# Patient Record
Sex: MALE | Race: WHITE | NOT HISPANIC OR LATINO | RURAL
[De-identification: names, ages, dates, MRNs, and addresses within clinical notes are randomized per-mention and may not be internally consistent; named-entity substitution may affect disease eponyms.]

---

## 2019-09-25 ENCOUNTER — HISTORICAL (OUTPATIENT)
Dept: ADMINISTRATIVE | Facility: HOSPITAL | Age: 79
End: 2019-09-25

## 2019-09-27 LAB
LAB AP CLINICAL INFORMATION: NORMAL
LAB AP COMMENTS: NORMAL
LAB AP DIAGNOSIS - HISTORICAL: NORMAL
LAB AP GROSS PATHOLOGY - HISTORICAL: NORMAL
LAB AP SPECIMEN SUBMITTED - HISTORICAL: NORMAL

## 2020-09-07 ENCOUNTER — HISTORICAL (OUTPATIENT)
Dept: ADMINISTRATIVE | Facility: HOSPITAL | Age: 80
End: 2020-09-07

## 2020-09-07 LAB — SARS-COV-2 RNA AMPLIFICATION, QUAL: NEGATIVE

## 2020-09-10 ENCOUNTER — HISTORICAL (OUTPATIENT)
Dept: ADMINISTRATIVE | Facility: HOSPITAL | Age: 80
End: 2020-09-10

## 2020-09-10 LAB
ABO: NORMAL
ACANTHOCYTES BLD QL SMEAR: ABNORMAL
ALBUMIN SERPL BCP-MCNC: 1.3 G/DL (ref 3.5–5)
ALBUMIN/GLOB SERPL: 0.8 {RATIO}
ALP SERPL-CCNC: 35 U/L (ref 45–115)
ALT SERPL W P-5'-P-CCNC: 10 U/L (ref 16–61)
ANION GAP SERPL CALCULATED.3IONS-SCNC: 12 MMOL/L
ANISOCYTOSIS BLD QL SMEAR: ABNORMAL
ANTIBODY SCREEN: NORMAL
APTT PPP: 38.5 SECONDS (ref 25.2–37.3)
AST SERPL W P-5'-P-CCNC: 11 U/L (ref 15–37)
BASOPHILS # BLD AUTO: 0.02 X10E3/UL (ref 0–0.2)
BASOPHILS NFR BLD AUTO: 0.3 % (ref 0–1)
BILIRUB SERPL-MCNC: 0.5 MG/DL (ref 0–1.2)
BUN SERPL-MCNC: 11 MG/DL (ref 7–18)
BUN/CREAT SERPL: 20
CALCIUM SERPL-MCNC: 4.7 MG/DL (ref 8.5–10.1)
CHLORIDE SERPL-SCNC: 121 MMOL/L (ref 98–107)
CK MB SERPL-MCNC: <1 NG/ML (ref 1–3.6)
CK SERPL-CCNC: 27 U/L (ref 39–308)
CO2 SERPL-SCNC: 15 MMOL/L (ref 21–32)
CREAT SERPL-MCNC: 0.55 MG/DL (ref 0.7–1.3)
CRENATED CELLS: ABNORMAL
D DIMER PPP FEU-MCNC: 5.31 UG/ML (ref 0–0.47)
EOSINOPHIL # BLD AUTO: 0.07 X10E3/UL (ref 0–0.5)
EOSINOPHIL NFR BLD AUTO: 1.1 % (ref 1–4)
EOSINOPHIL NFR BLD MANUAL: 4 % (ref 1–4)
ERYTHROCYTE [DISTWIDTH] IN BLOOD BY AUTOMATED COUNT: 16.1 % (ref 11.5–14.5)
FERRITIN SERPL-MCNC: 97 NG/ML (ref 26–388)
FOLATE SERPL-MCNC: 4.6 NG/ML (ref 3.1–17.5)
GLOBULIN SER-MCNC: 1.7 G/DL (ref 2–4)
GLUCOSE SERPL-MCNC: 80 MG/DL (ref 74–106)
HCT VFR BLD AUTO: 22.1 % (ref 40–54)
HCT VFR BLD AUTO: 38.6 % (ref 40–54)
HGB BLD-MCNC: 12.3 G/DL (ref 13.5–18)
HGB BLD-MCNC: 6.5 G/DL (ref 13.5–18)
HYPOCHROMIA BLD QL SMEAR: SLIGHT
IMM GRANULOCYTES # BLD AUTO: 0.08 X10E3/UL (ref 0–0.04)
IMM GRANULOCYTES NFR BLD: 1.3 % (ref 0–0.4)
IMM RETICS NFR: 17.7 % (ref 2.3–13.4)
INR BLD: 1.59 (ref 0–3.3)
IRON SATN MFR SERPL: 69 % (ref 14–50)
IRON SERPL-MCNC: 68 UG/DL (ref 65–175)
LACTATE SERPL-SCNC: 2.4 MMOL/L (ref 0.4–2)
LACTATE SERPL-SCNC: 3.1 MMOL/L (ref 0.4–2)
LYMPHOCYTES # BLD AUTO: 0.35 X10E3/UL (ref 1–4.8)
LYMPHOCYTES NFR BLD AUTO: 5.5 % (ref 27–41)
LYMPHOCYTES NFR BLD MANUAL: 7 % (ref 27–41)
MAGNESIUM SERPL-MCNC: 1 MG/DL (ref 1.7–2.3)
MCH RBC QN AUTO: 27.9 PG (ref 27–31)
MCHC RBC AUTO-ENTMCNC: 29.4 G/DL (ref 32–36)
MCHC RBC AUTO-ENTMCNC: 31.9 G/DL (ref 32–36)
MCV RBC AUTO: 94.8 FL (ref 80–96)
MONOCYTES # BLD AUTO: 0.12 X10E3/UL (ref 0–0.8)
MONOCYTES NFR BLD AUTO: 1.9 % (ref 2–6)
MONOCYTES NFR BLD MANUAL: 1 % (ref 2–6)
MPC BLD CALC-MCNC: 9.4 FL (ref 9.4–12.4)
MYOGLOBIN SERPL-MCNC: 65 NG/ML (ref 16–116)
NEUTROPHILS # BLD AUTO: 5.74 X10E3/UL (ref 1.8–7.7)
NEUTROPHILS NFR BLD AUTO: 89.9 % (ref 53–65)
NEUTS BAND NFR BLD MANUAL: 3 % (ref 1–5)
NEUTS SEG NFR BLD MANUAL: 85 % (ref 50–62)
NRBC # BLD AUTO: 0 X10E3/UL (ref 0–0)
NRBC, AUTO (.00): 0 /100 (ref 0–0)
OVALOCYTES BLD QL SMEAR: ABNORMAL
PLATELET # BLD AUTO: 163 X10E3/UL (ref 150–400)
PLATELET MORPHOLOGY: NORMAL
POTASSIUM SERPL-SCNC: 2.2 MMOL/L (ref 3.5–5.1)
PROT SERPL-MCNC: 3 G/DL (ref 6.4–8.2)
PROTHROMBIN TIME: 18.1 SECONDS (ref 11.7–14.7)
RBC # BLD AUTO: 2.33 X10E6/UL (ref 4.6–6.2)
RETICS # AUTO: 0.07 X10E6/UL (ref 0.02–0.11)
RETICS/RBC NFR AUTO: 1.8 % (ref 0.4–2.2)
RH TYPE: NORMAL
SODIUM SERPL-SCNC: 146 MMOL/L (ref 136–145)
TIBC SERPL-MCNC: 99 UG/DL (ref 250–450)
TROPONIN I SERPL-MCNC: 0.03 NG/ML (ref 0–0.06)
UNIT NUMBER: NORMAL
UNIT NUMBER: NORMAL
UNIT STATUS: NORMAL
UNIT STATUS: NORMAL
VIT B12 SERPL-MCNC: 165 PG/ML (ref 193–986)
WBC # BLD AUTO: 6.38 X10E3/UL (ref 4.5–11)

## 2020-09-11 ENCOUNTER — HISTORICAL (OUTPATIENT)
Dept: ADMINISTRATIVE | Facility: HOSPITAL | Age: 80
End: 2020-09-11

## 2020-09-11 LAB
ALBUMIN SERPL BCP-MCNC: 2.7 G/DL (ref 3.5–5)
ALBUMIN/GLOB SERPL: 0.8 {RATIO}
ALP SERPL-CCNC: 64 U/L (ref 45–115)
ALT SERPL W P-5'-P-CCNC: 19 U/L (ref 16–61)
ANION GAP SERPL CALCULATED.3IONS-SCNC: 13 MMOL/L
ANISOCYTOSIS BLD QL SMEAR: ABNORMAL
AST SERPL W P-5'-P-CCNC: 26 U/L (ref 15–37)
BASOPHILS # BLD AUTO: 0.05 X10E3/UL (ref 0–0.2)
BASOPHILS NFR BLD AUTO: 0.4 % (ref 0–1)
BILIRUB SERPL-MCNC: 0.9 MG/DL (ref 0–1.2)
BUN SERPL-MCNC: 26 MG/DL (ref 7–18)
BUN/CREAT SERPL: 20
CALCIUM SERPL-MCNC: 9.5 MG/DL (ref 8.5–10.1)
CHLORIDE SERPL-SCNC: 110 MMOL/L (ref 98–107)
CHOLEST SERPL-MCNC: 94 MG/DL
CHOLEST/HDLC SERPL: 2.4 {RATIO}
CO2 SERPL-SCNC: 25 MMOL/L (ref 21–32)
CREAT SERPL-MCNC: 1.3 MG/DL (ref 0.7–1.3)
CRENATED CELLS: ABNORMAL
CRP SERPL-MCNC: 8.8 UG/ML (ref 0–0.8)
EOSINOPHIL # BLD AUTO: 0.07 X10E3/UL (ref 0–0.5)
EOSINOPHIL NFR BLD AUTO: 0.6 % (ref 1–4)
ERYTHROCYTE [DISTWIDTH] IN BLOOD BY AUTOMATED COUNT: 15.7 % (ref 11.5–14.5)
ERYTHROCYTE [SEDIMENTATION RATE] IN BLOOD BY WESTERGREN METHOD: 17 MM/HR (ref 0–20)
GLOBULIN SER-MCNC: 3.3 G/DL (ref 2–4)
GLUCOSE SERPL-MCNC: 109 MG/DL (ref 74–106)
HCT VFR BLD AUTO: 38.2 % (ref 40–54)
HCT VFR BLD AUTO: 39 % (ref 40–54)
HDLC SERPL-MCNC: 39 MG/DL
HGB BLD-MCNC: 12 G/DL (ref 13.5–18)
HGB BLD-MCNC: 12.1 G/DL (ref 13.5–18)
IMM GRANULOCYTES # BLD AUTO: 0.09 X10E3/UL (ref 0–0.04)
IMM GRANULOCYTES NFR BLD: 0.7 % (ref 0–0.4)
LDLC SERPL CALC-MCNC: 30 MG/DL
LYMPHOCYTES # BLD AUTO: 0.5 X10E3/UL (ref 1–4.8)
LYMPHOCYTES NFR BLD AUTO: 4 % (ref 27–41)
LYMPHOCYTES NFR BLD MANUAL: 5 % (ref 27–41)
MAGNESIUM SERPL-MCNC: 2.1 MG/DL (ref 1.7–2.3)
MCH RBC QN AUTO: 27.7 PG (ref 27–31)
MCHC RBC AUTO-ENTMCNC: 31 G/DL (ref 32–36)
MCHC RBC AUTO-ENTMCNC: 31.4 G/DL (ref 32–36)
MCV RBC AUTO: 88.2 FL (ref 80–96)
MONOCYTES # BLD AUTO: 0.74 X10E3/UL (ref 0–0.8)
MONOCYTES NFR BLD AUTO: 5.9 % (ref 2–6)
MONOCYTES NFR BLD MANUAL: 6 % (ref 2–6)
MPC BLD CALC-MCNC: 9.9 FL (ref 9.4–12.4)
NEUTROPHILS # BLD AUTO: 11.14 X10E3/UL (ref 1.8–7.7)
NEUTROPHILS NFR BLD AUTO: 88.4 % (ref 53–65)
NEUTS BAND NFR BLD MANUAL: 4 % (ref 1–5)
NEUTS SEG NFR BLD MANUAL: 85 % (ref 50–62)
NRBC # BLD AUTO: 0 X10E3/UL (ref 0–0)
NRBC, AUTO (.00): 0 /100 (ref 0–0)
OCCULT BLOOD: POSITIVE
OVALOCYTES BLD QL SMEAR: ABNORMAL
PLATELET # BLD AUTO: 237 X10E3/UL (ref 150–400)
PLATELET MORPHOLOGY: ABNORMAL
POTASSIUM SERPL-SCNC: 5.2 MMOL/L (ref 3.5–5.1)
POTASSIUM SERPL-SCNC: 6 MMOL/L (ref 3.5–5.1)
PREALB SERPL NEPH-MCNC: 18 MG/DL (ref 20–40)
PROT SERPL-MCNC: 6 G/DL (ref 6.4–8.2)
RBC # BLD AUTO: 4.33 X10E6/UL (ref 4.6–6.2)
SODIUM SERPL-SCNC: 142 MMOL/L (ref 136–145)
T4 FREE SERPL-MCNC: 1.19 NG/DL (ref 0.76–1.46)
TRIGL SERPL-MCNC: 123 MG/DL
TSH SERPL DL<=0.005 MIU/L-ACNC: 1.96 UIU/ML (ref 0.36–3.74)
WBC # BLD AUTO: 12.59 X10E3/UL (ref 4.5–11)

## 2020-10-12 ENCOUNTER — HISTORICAL (OUTPATIENT)
Dept: ADMINISTRATIVE | Facility: HOSPITAL | Age: 80
End: 2020-10-12

## 2020-10-12 LAB — SARS-COV+SARS-COV-2 AG RESP QL IA.RAPID: NEGATIVE

## 2020-10-14 ENCOUNTER — HISTORICAL (OUTPATIENT)
Dept: ADMINISTRATIVE | Facility: HOSPITAL | Age: 80
End: 2020-10-14

## 2020-10-14 LAB — SARS-COV+SARS-COV-2 AG RESP QL IA.RAPID: NEGATIVE

## 2020-10-19 ENCOUNTER — HISTORICAL (OUTPATIENT)
Dept: ADMINISTRATIVE | Facility: HOSPITAL | Age: 80
End: 2020-10-19

## 2020-10-19 LAB — SARS-COV+SARS-COV-2 AG RESP QL IA.RAPID: NEGATIVE

## 2020-10-21 ENCOUNTER — HISTORICAL (OUTPATIENT)
Dept: ADMINISTRATIVE | Facility: HOSPITAL | Age: 80
End: 2020-10-21

## 2020-10-21 LAB — SARS-COV+SARS-COV-2 AG RESP QL IA.RAPID: NEGATIVE

## 2020-11-12 ENCOUNTER — HISTORICAL (OUTPATIENT)
Dept: ADMINISTRATIVE | Facility: HOSPITAL | Age: 80
End: 2020-11-12

## 2020-11-12 LAB
ALBUMIN SERPL BCP-MCNC: 2.8 G/DL (ref 3.5–5)
ALBUMIN/GLOB SERPL: 0.9 {RATIO}
ALP SERPL-CCNC: 77 U/L (ref 45–115)
ALT SERPL W P-5'-P-CCNC: 14 U/L (ref 16–61)
ANION GAP SERPL CALCULATED.3IONS-SCNC: 8 MMOL/L (ref 7–16)
AST SERPL W P-5'-P-CCNC: 12 U/L (ref 15–37)
BILIRUB SERPL-MCNC: 0.4 MG/DL (ref 0–1.2)
BUN SERPL-MCNC: 23 MG/DL (ref 7–18)
BUN/CREAT SERPL: 28
CALCIUM SERPL-MCNC: 8.2 MG/DL (ref 8.5–10.1)
CHLORIDE SERPL-SCNC: 107 MMOL/L (ref 98–107)
CO2 SERPL-SCNC: 33 MMOL/L (ref 21–32)
CREAT SERPL-MCNC: 0.82 MG/DL (ref 0.7–1.3)
GLOBULIN SER-MCNC: 3 G/DL (ref 2–4)
GLUCOSE SERPL-MCNC: 73 MG/DL (ref 74–106)
POTASSIUM SERPL-SCNC: 3.8 MMOL/L (ref 3.5–5.1)
PROT SERPL-MCNC: 5.8 G/DL (ref 6.4–8.2)
SODIUM SERPL-SCNC: 144 MMOL/L (ref 136–145)

## 2020-11-17 ENCOUNTER — HISTORICAL (OUTPATIENT)
Dept: ADMINISTRATIVE | Facility: HOSPITAL | Age: 80
End: 2020-11-17

## 2020-11-18 LAB — SARS-COV+SARS-COV-2 AG RESP QL IA.RAPID: NEGATIVE

## 2020-11-19 ENCOUNTER — HISTORICAL (OUTPATIENT)
Dept: ADMINISTRATIVE | Facility: HOSPITAL | Age: 80
End: 2020-11-19

## 2020-11-19 LAB — SARS-COV+SARS-COV-2 AG RESP QL IA.RAPID: NEGATIVE

## 2020-12-10 ENCOUNTER — HISTORICAL (OUTPATIENT)
Dept: ADMINISTRATIVE | Facility: HOSPITAL | Age: 80
End: 2020-12-10

## 2020-12-10 LAB — SARS-COV+SARS-COV-2 AG RESP QL IA.RAPID: NEGATIVE

## 2020-12-17 ENCOUNTER — HISTORICAL (OUTPATIENT)
Dept: ADMINISTRATIVE | Facility: HOSPITAL | Age: 80
End: 2020-12-17

## 2020-12-17 LAB — SARS-COV+SARS-COV-2 AG RESP QL IA.RAPID: NEGATIVE

## 2020-12-28 ENCOUNTER — HISTORICAL (OUTPATIENT)
Dept: ADMINISTRATIVE | Facility: HOSPITAL | Age: 80
End: 2020-12-28

## 2020-12-29 LAB — SARS-COV+SARS-COV-2 AG RESP QL IA.RAPID: NEGATIVE

## 2021-01-01 ENCOUNTER — HISTORICAL (OUTPATIENT)
Dept: ADMINISTRATIVE | Facility: HOSPITAL | Age: 81
End: 2021-01-01

## 2021-01-01 LAB — SARS-COV+SARS-COV-2 AG RESP QL IA.RAPID: NEGATIVE

## 2021-01-05 ENCOUNTER — HISTORICAL (OUTPATIENT)
Dept: ADMINISTRATIVE | Facility: HOSPITAL | Age: 81
End: 2021-01-05

## 2021-01-05 LAB — SARS-COV+SARS-COV-2 AG RESP QL IA.RAPID: NEGATIVE

## 2021-01-09 ENCOUNTER — HISTORICAL (OUTPATIENT)
Dept: ADMINISTRATIVE | Facility: HOSPITAL | Age: 81
End: 2021-01-09

## 2021-01-09 LAB — SARS-COV+SARS-COV-2 AG RESP QL IA.RAPID: NEGATIVE

## 2021-02-05 ENCOUNTER — HISTORICAL (OUTPATIENT)
Dept: ADMINISTRATIVE | Facility: HOSPITAL | Age: 81
End: 2021-02-05

## 2021-02-05 LAB — SARS-COV+SARS-COV-2 AG RESP QL IA.RAPID: NEGATIVE

## 2021-02-24 ENCOUNTER — HISTORICAL (OUTPATIENT)
Dept: ADMINISTRATIVE | Facility: HOSPITAL | Age: 81
End: 2021-02-24

## 2021-02-25 LAB — SARS-COV+SARS-COV-2 AG RESP QL IA.RAPID: NEGATIVE

## 2021-03-04 ENCOUNTER — HISTORICAL (OUTPATIENT)
Dept: ADMINISTRATIVE | Facility: HOSPITAL | Age: 81
End: 2021-03-04

## 2021-03-05 LAB — SARS-COV+SARS-COV-2 AG RESP QL IA.RAPID: NEGATIVE

## 2023-11-27 ENCOUNTER — INITIAL CONSULT (OUTPATIENT)
Dept: VASCULAR SURGERY | Facility: CLINIC | Age: 83
DRG: 256 | End: 2023-11-27
Payer: MEDICARE

## 2023-11-27 ENCOUNTER — HOSPITAL ENCOUNTER (INPATIENT)
Facility: HOSPITAL | Age: 83
LOS: 1 days | Discharge: NURSING, REHAB OR LONG-TERM CARE | DRG: 256 | End: 2023-11-28
Attending: EMERGENCY MEDICINE | Admitting: SURGERY
Payer: MEDICARE

## 2023-11-27 DIAGNOSIS — L02.612 ABSCESS, TOE, LEFT: ICD-10-CM

## 2023-11-27 DIAGNOSIS — I73.9 PVD (PERIPHERAL VASCULAR DISEASE): ICD-10-CM

## 2023-11-27 DIAGNOSIS — L03.90 CELLULITIS, UNSPECIFIED CELLULITIS SITE: Primary | ICD-10-CM

## 2023-11-27 DIAGNOSIS — L02.612 ABSCESS, TOE, LEFT: Primary | ICD-10-CM

## 2023-11-27 LAB — SARS-COV-2 RDRP RESP QL NAA+PROBE: NEGATIVE

## 2023-11-27 PROCEDURE — 99285 PR EMERGENCY DEPT VISIT,LEVEL V: ICD-10-PCS | Mod: ,,, | Performed by: EMERGENCY MEDICINE

## 2023-11-27 PROCEDURE — 99212 OFFICE O/P EST SF 10 MIN: CPT | Mod: PBBFAC | Performed by: NURSE PRACTITIONER

## 2023-11-27 PROCEDURE — 99205 OFFICE O/P NEW HI 60 MIN: CPT | Mod: S$PBB,,, | Performed by: NURSE PRACTITIONER

## 2023-11-27 PROCEDURE — 87635 SARS-COV-2 COVID-19 AMP PRB: CPT | Performed by: SURGERY

## 2023-11-27 PROCEDURE — 99285 EMERGENCY DEPT VISIT HI MDM: CPT | Mod: ,,, | Performed by: EMERGENCY MEDICINE

## 2023-11-27 PROCEDURE — 25000003 PHARM REV CODE 250: Performed by: SURGERY

## 2023-11-27 PROCEDURE — 11000001 HC ACUTE MED/SURG PRIVATE ROOM

## 2023-11-27 PROCEDURE — 99205 PR OFFICE/OUTPT VISIT, NEW, LEVL V, 60-74 MIN: ICD-10-PCS | Mod: S$PBB,,, | Performed by: NURSE PRACTITIONER

## 2023-11-27 RX ORDER — MORPHINE SULFATE 2 MG/ML
2 INJECTION, SOLUTION INTRAMUSCULAR; INTRAVENOUS EVERY 4 HOURS PRN
Status: DISCONTINUED | OUTPATIENT
Start: 2023-11-27 | End: 2023-11-28 | Stop reason: HOSPADM

## 2023-11-27 RX ORDER — PROCHLORPERAZINE EDISYLATE 5 MG/ML
5 INJECTION INTRAMUSCULAR; INTRAVENOUS EVERY 6 HOURS PRN
Status: DISCONTINUED | OUTPATIENT
Start: 2023-11-27 | End: 2023-11-28 | Stop reason: HOSPADM

## 2023-11-27 RX ORDER — TALC
6 POWDER (GRAM) TOPICAL NIGHTLY PRN
Status: DISCONTINUED | OUTPATIENT
Start: 2023-11-27 | End: 2023-11-28 | Stop reason: HOSPADM

## 2023-11-27 RX ORDER — ONDANSETRON 2 MG/ML
4 INJECTION INTRAMUSCULAR; INTRAVENOUS EVERY 8 HOURS PRN
Status: DISCONTINUED | OUTPATIENT
Start: 2023-11-27 | End: 2023-11-28 | Stop reason: HOSPADM

## 2023-11-27 RX ORDER — SODIUM CHLORIDE 0.9 % (FLUSH) 0.9 %
10 SYRINGE (ML) INJECTION
Status: DISCONTINUED | OUTPATIENT
Start: 2023-11-27 | End: 2023-11-28 | Stop reason: HOSPADM

## 2023-11-27 RX ORDER — MUPIROCIN 20 MG/G
OINTMENT TOPICAL 2 TIMES DAILY
Status: DISCONTINUED | OUTPATIENT
Start: 2023-11-27 | End: 2023-11-28 | Stop reason: HOSPADM

## 2023-11-27 RX ADMIN — MUPIROCIN: 20 OINTMENT TOPICAL at 09:11

## 2023-11-27 NOTE — PROGRESS NOTES
Subjective:       Patient ID: Riley Fox is a 83 y.o. male.    Chief Complaint: No chief complaint on file.  Patient is a resident at [facility] rehab referred for evaluation of PVD arterial duplex at [facility] on 11/16/2023 without occlusion or high-grade stenosis noted no ABIs were done  Patient is wheelchair-bound from spinal stenosis  Left great toe edematous tender with cellulitis and purulent drainage expressed from joint  family history is not on file.  History reviewed. No pertinent past medical history.   History reviewed. No pertinent surgical history.       HPI  Review of Systems   Integumentary:  Positive for wound.         Objective:      There were no vitals taken for this visit.   Physical Exam  Vitals and nursing note reviewed.   Constitutional:       Appearance: Normal appearance.   HENT:      Head: Normocephalic.      Mouth/Throat:      Mouth: Mucous membranes are moist.   Eyes:      Conjunctiva/sclera: Conjunctivae normal.   Cardiovascular:      Rate and Rhythm: Normal rate and regular rhythm.   Pulmonary:      Effort: Pulmonary effort is normal.   Abdominal:      Palpations: Abdomen is soft.   Skin:     General: Skin is warm and dry.   Neurological:      Mental Status: He is alert and oriented to person, place, and time.   Psychiatric:         Mood and Affect: Mood normal.           Assessment:       1. Cellulitis, unspecified cellulitis site    2. Abscess, toe, left        Plan:       Maria R patient to the ER Admit for IV antibiotics consult hospitalist NPO after midnight or left great toe amputation Dr. Butcher

## 2023-11-28 ENCOUNTER — ANESTHESIA (OUTPATIENT)
Dept: SURGERY | Facility: HOSPITAL | Age: 83
DRG: 256 | End: 2023-11-28
Payer: MEDICARE

## 2023-11-28 ENCOUNTER — HOSPITAL ENCOUNTER (INPATIENT)
Facility: HOSPITAL | Age: 83
LOS: 13 days | Discharge: NURSING, REHAB OR LONG-TERM CARE | DRG: 603 | End: 2023-12-11
Attending: INTERNAL MEDICINE | Admitting: INTERNAL MEDICINE
Payer: MEDICARE

## 2023-11-28 ENCOUNTER — ANESTHESIA EVENT (OUTPATIENT)
Dept: SURGERY | Facility: HOSPITAL | Age: 83
DRG: 256 | End: 2023-11-28
Payer: MEDICARE

## 2023-11-28 VITALS
HEART RATE: 84 BPM | SYSTOLIC BLOOD PRESSURE: 103 MMHG | TEMPERATURE: 98 F | WEIGHT: 166 LBS | HEIGHT: 73 IN | DIASTOLIC BLOOD PRESSURE: 65 MMHG | RESPIRATION RATE: 15 BRPM | BODY MASS INDEX: 22 KG/M2 | OXYGEN SATURATION: 100 %

## 2023-11-28 DIAGNOSIS — S90.811D: ICD-10-CM

## 2023-11-28 DIAGNOSIS — S91.102D OPEN WOUND OF LEFT GREAT TOE, SUBSEQUENT ENCOUNTER: ICD-10-CM

## 2023-11-28 DIAGNOSIS — R00.1 BRADYCARDIA: ICD-10-CM

## 2023-11-28 DIAGNOSIS — L08.9 WOUND INFECTION: ICD-10-CM

## 2023-11-28 DIAGNOSIS — L02.612 ABSCESS, TOE, LEFT: Primary | ICD-10-CM

## 2023-11-28 DIAGNOSIS — B37.2 YEAST DERMATITIS: ICD-10-CM

## 2023-11-28 DIAGNOSIS — L97.512 NON-PRESSURE CHRONIC ULCER OF OTHER PART OF RIGHT FOOT WITH FAT LAYER EXPOSED: ICD-10-CM

## 2023-11-28 DIAGNOSIS — S90.811A ABRASION OF RIGHT HEEL, INITIAL ENCOUNTER: ICD-10-CM

## 2023-11-28 DIAGNOSIS — T14.8XXA WOUND INFECTION: ICD-10-CM

## 2023-11-28 PROBLEM — N18.5 CKD (CHRONIC KIDNEY DISEASE), STAGE V: Status: ACTIVE | Noted: 2023-11-28

## 2023-11-28 PROBLEM — F03.90 DEMENTIA: Status: ACTIVE | Noted: 2023-11-28

## 2023-11-28 PROBLEM — R25.1 TREMOR: Status: ACTIVE | Noted: 2023-11-28

## 2023-11-28 PROBLEM — I10 HTN (HYPERTENSION): Status: ACTIVE | Noted: 2023-11-28

## 2023-11-28 LAB
ANION GAP SERPL CALCULATED.3IONS-SCNC: 10 MMOL/L (ref 7–16)
APTT PPP: 35.4 SECONDS (ref 25.2–37.3)
BASOPHILS # BLD AUTO: 0.03 K/UL (ref 0–0.2)
BASOPHILS NFR BLD AUTO: 0.4 % (ref 0–1)
BUN SERPL-MCNC: 40 MG/DL (ref 7–18)
BUN/CREAT SERPL: 21 (ref 6–20)
CALCIUM SERPL-MCNC: 9 MG/DL (ref 8.5–10.1)
CHLORIDE SERPL-SCNC: 112 MMOL/L (ref 98–107)
CO2 SERPL-SCNC: 23 MMOL/L (ref 21–32)
CREAT SERPL-MCNC: 1.92 MG/DL (ref 0.7–1.3)
DIFFERENTIAL METHOD BLD: ABNORMAL
EGFR (NO RACE VARIABLE) (RUSH/TITUS): 35 ML/MIN/1.73M2
EOSINOPHIL # BLD AUTO: 0.19 K/UL (ref 0–0.5)
EOSINOPHIL NFR BLD AUTO: 2.4 % (ref 1–4)
ERYTHROCYTE [DISTWIDTH] IN BLOOD BY AUTOMATED COUNT: 15.9 % (ref 11.5–14.5)
GLUCOSE SERPL-MCNC: 92 MG/DL (ref 74–106)
HCT VFR BLD AUTO: 32 % (ref 40–54)
HGB BLD-MCNC: 9.7 G/DL (ref 13.5–18)
IMM GRANULOCYTES # BLD AUTO: 0.08 K/UL (ref 0–0.04)
IMM GRANULOCYTES NFR BLD: 1 % (ref 0–0.4)
INR BLD: 1.31
LYMPHOCYTES # BLD AUTO: 0.87 K/UL (ref 1–4.8)
LYMPHOCYTES NFR BLD AUTO: 11 % (ref 27–41)
MCH RBC QN AUTO: 28 PG (ref 27–31)
MCHC RBC AUTO-ENTMCNC: 30.3 G/DL (ref 32–36)
MCV RBC AUTO: 92.5 FL (ref 80–96)
MONOCYTES # BLD AUTO: 0.92 K/UL (ref 0–0.8)
MONOCYTES NFR BLD AUTO: 11.6 % (ref 2–6)
MPC BLD CALC-MCNC: 9.8 FL (ref 9.4–12.4)
NEUTROPHILS # BLD AUTO: 5.82 K/UL (ref 1.8–7.7)
NEUTROPHILS NFR BLD AUTO: 73.6 % (ref 53–65)
NRBC # BLD AUTO: 0 X10E3/UL
NRBC, AUTO (.00): 0 %
PLATELET # BLD AUTO: 309 K/UL (ref 150–400)
POTASSIUM SERPL-SCNC: 4.6 MMOL/L (ref 3.5–5.1)
PROTHROMBIN TIME: 16.1 SECONDS (ref 11.7–14.7)
RBC # BLD AUTO: 3.46 M/UL (ref 4.6–6.2)
SODIUM SERPL-SCNC: 140 MMOL/L (ref 136–145)
WBC # BLD AUTO: 7.91 K/UL (ref 4.5–11)

## 2023-11-28 PROCEDURE — 63600175 PHARM REV CODE 636 W HCPCS: Performed by: INTERNAL MEDICINE

## 2023-11-28 PROCEDURE — D9220A PRA ANESTHESIA: Mod: ANES,,, | Performed by: ANESTHESIOLOGY

## 2023-11-28 PROCEDURE — 36000706: Performed by: SURGERY

## 2023-11-28 PROCEDURE — 11000001 HC ACUTE MED/SURG PRIVATE ROOM

## 2023-11-28 PROCEDURE — D9220A PRA ANESTHESIA: ICD-10-PCS | Mod: CRNA,,, | Performed by: NURSE ANESTHETIST, CERTIFIED REGISTERED

## 2023-11-28 PROCEDURE — 25000003 PHARM REV CODE 250: Performed by: INTERNAL MEDICINE

## 2023-11-28 PROCEDURE — 88311 DECALCIFY TISSUE: CPT | Mod: 26,,, | Performed by: PATHOLOGY

## 2023-11-28 PROCEDURE — 63600175 PHARM REV CODE 636 W HCPCS: Performed by: NURSE PRACTITIONER

## 2023-11-28 PROCEDURE — 27000284 HC CANNULA NASAL: Performed by: ANESTHESIOLOGY

## 2023-11-28 PROCEDURE — 27000655: Performed by: ANESTHESIOLOGY

## 2023-11-28 PROCEDURE — D9220A PRA ANESTHESIA: Mod: CRNA,,, | Performed by: NURSE ANESTHETIST, CERTIFIED REGISTERED

## 2023-11-28 PROCEDURE — 88305 TISSUE EXAM BY PATHOLOGIST: CPT | Mod: 26,,, | Performed by: PATHOLOGY

## 2023-11-28 PROCEDURE — 25000003 PHARM REV CODE 250: Performed by: NURSE PRACTITIONER

## 2023-11-28 PROCEDURE — 99499 UNLISTED E&M SERVICE: CPT | Mod: ,,, | Performed by: NURSE PRACTITIONER

## 2023-11-28 PROCEDURE — 27000177 HC AIRWAY, LARYNGEAL MASK: Performed by: ANESTHESIOLOGY

## 2023-11-28 PROCEDURE — 37000008 HC ANESTHESIA 1ST 15 MINUTES: Performed by: SURGERY

## 2023-11-28 PROCEDURE — 80048 BASIC METABOLIC PNL TOTAL CA: CPT | Performed by: NURSE PRACTITIONER

## 2023-11-28 PROCEDURE — 88311 PR  DECALCIFY TISSUE: ICD-10-PCS | Mod: 26,,, | Performed by: PATHOLOGY

## 2023-11-28 PROCEDURE — 85025 COMPLETE CBC W/AUTO DIFF WBC: CPT | Performed by: NURSE PRACTITIONER

## 2023-11-28 PROCEDURE — 25000003 PHARM REV CODE 250: Performed by: SURGERY

## 2023-11-28 PROCEDURE — 27000510 HC BLANKET BAIR HUGGER ANY SIZE: Performed by: ANESTHESIOLOGY

## 2023-11-28 PROCEDURE — 99223 1ST HOSP IP/OBS HIGH 75: CPT | Mod: AI,,, | Performed by: INTERNAL MEDICINE

## 2023-11-28 PROCEDURE — 25000003 PHARM REV CODE 250: Performed by: NURSE ANESTHETIST, CERTIFIED REGISTERED

## 2023-11-28 PROCEDURE — 99499 NO LOS: ICD-10-PCS | Mod: ,,, | Performed by: NURSE PRACTITIONER

## 2023-11-28 PROCEDURE — D9220A PRA ANESTHESIA: ICD-10-PCS | Mod: ANES,,, | Performed by: ANESTHESIOLOGY

## 2023-11-28 PROCEDURE — 88305 SURGICAL PATHOLOGY: ICD-10-PCS | Mod: 26,,, | Performed by: PATHOLOGY

## 2023-11-28 PROCEDURE — 71000033 HC RECOVERY, INTIAL HOUR: Performed by: SURGERY

## 2023-11-28 PROCEDURE — 99223 PR INITIAL HOSPITAL CARE,LEVL III: ICD-10-PCS | Mod: AI,,, | Performed by: INTERNAL MEDICINE

## 2023-11-28 PROCEDURE — 87070 CULTURE OTHR SPECIMN AEROBIC: CPT | Performed by: SURGERY

## 2023-11-28 PROCEDURE — 28820 AMPUTATION OF TOE: CPT | Mod: TA,,, | Performed by: SURGERY

## 2023-11-28 PROCEDURE — 37000009 HC ANESTHESIA EA ADD 15 MINS: Performed by: SURGERY

## 2023-11-28 PROCEDURE — 36000707: Performed by: SURGERY

## 2023-11-28 PROCEDURE — 85730 THROMBOPLASTIN TIME PARTIAL: CPT | Performed by: INTERNAL MEDICINE

## 2023-11-28 PROCEDURE — 87075 CULTR BACTERIA EXCEPT BLOOD: CPT | Performed by: SURGERY

## 2023-11-28 PROCEDURE — 63600175 PHARM REV CODE 636 W HCPCS: Performed by: NURSE ANESTHETIST, CERTIFIED REGISTERED

## 2023-11-28 PROCEDURE — 99285 EMERGENCY DEPT VISIT HI MDM: CPT | Mod: 25

## 2023-11-28 PROCEDURE — 27000716 HC OXISENSOR PROBE, ANY SIZE: Performed by: ANESTHESIOLOGY

## 2023-11-28 PROCEDURE — 25000003 PHARM REV CODE 250: Performed by: HOSPITALIST

## 2023-11-28 PROCEDURE — 28820 PR AMPUTATION TOE,MT-P JT: ICD-10-PCS | Mod: TA,,, | Performed by: SURGERY

## 2023-11-28 PROCEDURE — 88305 TISSUE EXAM BY PATHOLOGIST: CPT | Mod: TC,SUR | Performed by: SURGERY

## 2023-11-28 PROCEDURE — 85610 PROTHROMBIN TIME: CPT | Performed by: INTERNAL MEDICINE

## 2023-11-28 RX ORDER — HYDROCODONE BITARTRATE AND ACETAMINOPHEN 5; 325 MG/1; MG/1
1 TABLET ORAL NIGHTLY
COMMUNITY

## 2023-11-28 RX ORDER — MELATONIN 3 MG
6 CAPSULE ORAL NIGHTLY
Status: ON HOLD | COMMUNITY
End: 2023-12-11 | Stop reason: HOSPADM

## 2023-11-28 RX ORDER — UBIDECARENONE 75 MG
2000 CAPSULE ORAL DAILY
Status: DISCONTINUED | OUTPATIENT
Start: 2023-11-29 | End: 2023-12-11 | Stop reason: HOSPADM

## 2023-11-28 RX ORDER — PANTOPRAZOLE SODIUM 20 MG/1
20 TABLET, DELAYED RELEASE ORAL DAILY
COMMUNITY
Start: 2023-11-09

## 2023-11-28 RX ORDER — HYDROCODONE BITARTRATE AND ACETAMINOPHEN 10; 325 MG/1; MG/1
1 TABLET ORAL EVERY 4 HOURS PRN
Status: DISCONTINUED | OUTPATIENT
Start: 2023-11-28 | End: 2023-11-28 | Stop reason: HOSPADM

## 2023-11-28 RX ORDER — MUPIROCIN 20 MG/G
OINTMENT TOPICAL DAILY
Status: ON HOLD | COMMUNITY
Start: 2023-11-23 | End: 2023-12-11 | Stop reason: HOSPADM

## 2023-11-28 RX ORDER — DONEPEZIL HYDROCHLORIDE 10 MG/1
10 TABLET, FILM COATED ORAL NIGHTLY
COMMUNITY
Start: 2023-11-01

## 2023-11-28 RX ORDER — MEMANTINE HYDROCHLORIDE 5 MG/1
10 TABLET ORAL 2 TIMES DAILY
Status: DISCONTINUED | OUTPATIENT
Start: 2023-11-28 | End: 2023-12-11 | Stop reason: HOSPADM

## 2023-11-28 RX ORDER — SODIUM CHLORIDE, SODIUM LACTATE, POTASSIUM CHLORIDE, CALCIUM CHLORIDE 600; 310; 30; 20 MG/100ML; MG/100ML; MG/100ML; MG/100ML
INJECTION, SOLUTION INTRAVENOUS CONTINUOUS
Status: DISCONTINUED | OUTPATIENT
Start: 2023-11-28 | End: 2023-11-28 | Stop reason: HOSPADM

## 2023-11-28 RX ORDER — POLYETHYLENE GLYCOL 3350 17 G/17G
17 POWDER, FOR SOLUTION ORAL DAILY
Status: ON HOLD | COMMUNITY
End: 2023-12-11 | Stop reason: HOSPADM

## 2023-11-28 RX ORDER — CEFAZOLIN SODIUM 1 G/3ML
INJECTION, POWDER, FOR SOLUTION INTRAMUSCULAR; INTRAVENOUS
Status: DISCONTINUED | OUTPATIENT
Start: 2023-11-28 | End: 2023-11-28

## 2023-11-28 RX ORDER — PROMETHAZINE HYDROCHLORIDE 25 MG/ML
12.5 INJECTION, SOLUTION INTRAMUSCULAR; INTRAVENOUS EVERY 4 HOURS PRN
COMMUNITY
Start: 2023-09-11

## 2023-11-28 RX ORDER — POLYVINYL ALCOHOL 14 MG/ML
1 SOLUTION/ DROPS OPHTHALMIC 2 TIMES DAILY PRN
COMMUNITY

## 2023-11-28 RX ORDER — MEPERIDINE HYDROCHLORIDE 25 MG/ML
25 INJECTION INTRAMUSCULAR; INTRAVENOUS; SUBCUTANEOUS EVERY 10 MIN PRN
Status: DISCONTINUED | OUTPATIENT
Start: 2023-11-28 | End: 2023-11-28 | Stop reason: HOSPADM

## 2023-11-28 RX ORDER — LEVOFLOXACIN 500 MG/1
500 TABLET, FILM COATED ORAL DAILY
Status: ON HOLD | COMMUNITY
Start: 2023-11-23 | End: 2023-12-11 | Stop reason: HOSPADM

## 2023-11-28 RX ORDER — ONDANSETRON 2 MG/ML
4 INJECTION INTRAMUSCULAR; INTRAVENOUS EVERY 12 HOURS PRN
Status: DISCONTINUED | OUTPATIENT
Start: 2023-11-28 | End: 2023-11-28 | Stop reason: HOSPADM

## 2023-11-28 RX ORDER — MIRTAZAPINE 7.5 MG/1
7.5 TABLET, FILM COATED ORAL NIGHTLY
COMMUNITY
Start: 2023-11-20

## 2023-11-28 RX ORDER — IPRATROPIUM BROMIDE AND ALBUTEROL SULFATE 2.5; .5 MG/3ML; MG/3ML
3 SOLUTION RESPIRATORY (INHALATION) ONCE
Status: DISCONTINUED | OUTPATIENT
Start: 2023-11-28 | End: 2023-11-28 | Stop reason: HOSPADM

## 2023-11-28 RX ORDER — ASPIRIN 81 MG/1
81 TABLET ORAL NIGHTLY
Status: DISCONTINUED | OUTPATIENT
Start: 2023-11-28 | End: 2023-12-11 | Stop reason: HOSPADM

## 2023-11-28 RX ORDER — CARVEDILOL 6.25 MG/1
6.25 TABLET ORAL DAILY
Status: DISCONTINUED | OUTPATIENT
Start: 2023-11-29 | End: 2023-12-11 | Stop reason: HOSPADM

## 2023-11-28 RX ORDER — GLYCOPYRROLATE 0.2 MG/ML
INJECTION INTRAMUSCULAR; INTRAVENOUS
Status: DISCONTINUED | OUTPATIENT
Start: 2023-11-28 | End: 2023-11-28

## 2023-11-28 RX ORDER — PANTOPRAZOLE SODIUM 20 MG/1
20 TABLET, DELAYED RELEASE ORAL DAILY
Status: DISCONTINUED | OUTPATIENT
Start: 2023-11-29 | End: 2023-12-11 | Stop reason: HOSPADM

## 2023-11-28 RX ORDER — FAMOTIDINE 10 MG/ML
20 INJECTION INTRAVENOUS 2 TIMES DAILY
Status: DISCONTINUED | OUTPATIENT
Start: 2023-11-28 | End: 2023-11-28

## 2023-11-28 RX ORDER — PROPOFOL 10 MG/ML
INJECTION, EMULSION INTRAVENOUS
Status: DISCONTINUED | OUTPATIENT
Start: 2023-11-28 | End: 2023-11-28

## 2023-11-28 RX ORDER — ACETAMINOPHEN 325 MG/1
650 TABLET ORAL EVERY 8 HOURS PRN
Status: DISCONTINUED | OUTPATIENT
Start: 2023-11-28 | End: 2023-11-28

## 2023-11-28 RX ORDER — MEMANTINE HYDROCHLORIDE 10 MG/1
10 TABLET ORAL 2 TIMES DAILY
COMMUNITY
Start: 2023-11-03

## 2023-11-28 RX ORDER — ONDANSETRON 2 MG/ML
4 INJECTION INTRAMUSCULAR; INTRAVENOUS DAILY PRN
Status: DISCONTINUED | OUTPATIENT
Start: 2023-11-28 | End: 2023-11-28 | Stop reason: HOSPADM

## 2023-11-28 RX ORDER — ONDANSETRON 4 MG/1
8 TABLET, ORALLY DISINTEGRATING ORAL EVERY 8 HOURS PRN
Status: DISCONTINUED | OUTPATIENT
Start: 2023-11-28 | End: 2023-12-11 | Stop reason: HOSPADM

## 2023-11-28 RX ORDER — SODIUM CHLORIDE 9 MG/ML
INJECTION, SOLUTION INTRAVENOUS CONTINUOUS
Status: DISPENSED | OUTPATIENT
Start: 2023-11-28 | End: 2023-11-29

## 2023-11-28 RX ORDER — ROPINIROLE 0.5 MG/1
0.5 TABLET, FILM COATED ORAL NIGHTLY
COMMUNITY
Start: 2021-03-11

## 2023-11-28 RX ORDER — FAMOTIDINE 20 MG/1
20 TABLET, FILM COATED ORAL DAILY
Status: ON HOLD | COMMUNITY
Start: 2023-10-14 | End: 2023-12-11 | Stop reason: HOSPADM

## 2023-11-28 RX ORDER — POLYETHYLENE GLYCOL 3350 17 G/17G
17 POWDER, FOR SOLUTION ORAL DAILY
Status: DISCONTINUED | OUTPATIENT
Start: 2023-11-29 | End: 2023-12-11 | Stop reason: HOSPADM

## 2023-11-28 RX ORDER — LISINOPRIL 10 MG/1
10 TABLET ORAL DAILY
Status: DISCONTINUED | OUTPATIENT
Start: 2023-11-29 | End: 2023-12-11 | Stop reason: HOSPADM

## 2023-11-28 RX ORDER — LISINOPRIL 10 MG/1
10 TABLET ORAL DAILY
COMMUNITY
Start: 2023-03-13

## 2023-11-28 RX ORDER — TICAGRELOR 90 MG/1
90 TABLET ORAL 2 TIMES DAILY
COMMUNITY
Start: 2023-11-25

## 2023-11-28 RX ORDER — OXYBUTYNIN CHLORIDE 10 MG/1
10 TABLET, EXTENDED RELEASE ORAL NIGHTLY
COMMUNITY

## 2023-11-28 RX ORDER — UBIDECARENONE 75 MG
4 CAPSULE ORAL DAILY
COMMUNITY

## 2023-11-28 RX ORDER — NYSTATIN 100000 [USP'U]/ML
10 SUSPENSION ORAL 3 TIMES DAILY
Status: ON HOLD | COMMUNITY
Start: 2023-11-23 | End: 2023-12-11 | Stop reason: HOSPADM

## 2023-11-28 RX ORDER — METOCLOPRAMIDE HYDROCHLORIDE 5 MG/ML
5 INJECTION INTRAMUSCULAR; INTRAVENOUS EVERY 6 HOURS PRN
Status: DISCONTINUED | OUTPATIENT
Start: 2023-11-28 | End: 2023-11-28 | Stop reason: HOSPADM

## 2023-11-28 RX ORDER — ROSUVASTATIN CALCIUM 20 MG/1
10 TABLET, COATED ORAL NIGHTLY
Status: ON HOLD | COMMUNITY
Start: 2022-11-19 | End: 2023-12-11 | Stop reason: HOSPADM

## 2023-11-28 RX ORDER — OXYCODONE HYDROCHLORIDE 5 MG/1
5 TABLET ORAL EVERY 4 HOURS PRN
Status: DISCONTINUED | OUTPATIENT
Start: 2023-11-28 | End: 2023-11-30

## 2023-11-28 RX ORDER — DIPHENHYDRAMINE HYDROCHLORIDE 50 MG/ML
25 INJECTION INTRAMUSCULAR; INTRAVENOUS EVERY 6 HOURS PRN
Status: DISCONTINUED | OUTPATIENT
Start: 2023-11-28 | End: 2023-11-28 | Stop reason: HOSPADM

## 2023-11-28 RX ORDER — LIDOCAINE HYDROCHLORIDE 20 MG/ML
INJECTION, SOLUTION EPIDURAL; INFILTRATION; INTRACAUDAL; PERINEURAL
Status: DISCONTINUED | OUTPATIENT
Start: 2023-11-28 | End: 2023-11-28

## 2023-11-28 RX ORDER — TALC
6 POWDER (GRAM) TOPICAL NIGHTLY PRN
Status: DISCONTINUED | OUTPATIENT
Start: 2023-11-28 | End: 2023-11-28 | Stop reason: HOSPADM

## 2023-11-28 RX ORDER — ACETAMINOPHEN 325 MG/1
650 TABLET ORAL EVERY 4 HOURS PRN
Status: DISCONTINUED | OUTPATIENT
Start: 2023-11-28 | End: 2023-12-11 | Stop reason: HOSPADM

## 2023-11-28 RX ORDER — MORPHINE SULFATE 10 MG/ML
4 INJECTION INTRAMUSCULAR; INTRAVENOUS; SUBCUTANEOUS EVERY 5 MIN PRN
Status: DISCONTINUED | OUTPATIENT
Start: 2023-11-28 | End: 2023-11-28 | Stop reason: HOSPADM

## 2023-11-28 RX ORDER — HEPARIN SODIUM 5000 [USP'U]/ML
5000 INJECTION, SOLUTION INTRAVENOUS; SUBCUTANEOUS EVERY 8 HOURS
Status: DISCONTINUED | OUTPATIENT
Start: 2023-11-28 | End: 2023-11-28 | Stop reason: HOSPADM

## 2023-11-28 RX ORDER — LINEZOLID 2 MG/ML
600 INJECTION, SOLUTION INTRAVENOUS
Status: DISCONTINUED | OUTPATIENT
Start: 2023-11-28 | End: 2023-11-28 | Stop reason: HOSPADM

## 2023-11-28 RX ORDER — DONEPEZIL HYDROCHLORIDE 5 MG/1
10 TABLET, FILM COATED ORAL NIGHTLY
Status: DISCONTINUED | OUTPATIENT
Start: 2023-11-28 | End: 2023-12-11 | Stop reason: HOSPADM

## 2023-11-28 RX ORDER — MUPIROCIN 20 MG/G
OINTMENT TOPICAL 2 TIMES DAILY
Status: COMPLETED | OUTPATIENT
Start: 2023-11-28 | End: 2023-12-03

## 2023-11-28 RX ORDER — DOCUSATE SODIUM 100 MG/1
200 CAPSULE, LIQUID FILLED ORAL 2 TIMES DAILY
Status: DISCONTINUED | OUTPATIENT
Start: 2023-11-28 | End: 2023-12-11 | Stop reason: HOSPADM

## 2023-11-28 RX ORDER — LANOLIN ALCOHOL/MO/W.PET/CERES
1 CREAM (GRAM) TOPICAL 2 TIMES DAILY
COMMUNITY

## 2023-11-28 RX ORDER — FAMOTIDINE 20 MG/1
20 TABLET, FILM COATED ORAL DAILY
Status: DISCONTINUED | OUTPATIENT
Start: 2023-11-29 | End: 2023-12-11 | Stop reason: HOSPADM

## 2023-11-28 RX ORDER — ACETAMINOPHEN 325 MG/1
650 TABLET ORAL EVERY 4 HOURS PRN
Status: DISCONTINUED | OUTPATIENT
Start: 2023-11-28 | End: 2023-11-28 | Stop reason: HOSPADM

## 2023-11-28 RX ORDER — FENTANYL CITRATE 50 UG/ML
INJECTION, SOLUTION INTRAMUSCULAR; INTRAVENOUS
Status: DISCONTINUED | OUTPATIENT
Start: 2023-11-28 | End: 2023-11-28

## 2023-11-28 RX ORDER — CARVEDILOL 6.25 MG/1
6.25 TABLET ORAL DAILY
COMMUNITY

## 2023-11-28 RX ORDER — LANOLIN ALCOHOL/MO/W.PET/CERES
1 CREAM (GRAM) TOPICAL 2 TIMES DAILY
Status: DISCONTINUED | OUTPATIENT
Start: 2023-11-28 | End: 2023-12-11 | Stop reason: HOSPADM

## 2023-11-28 RX ORDER — ACETAMINOPHEN 325 MG/1
650 TABLET ORAL EVERY 8 HOURS PRN
Status: DISCONTINUED | OUTPATIENT
Start: 2023-11-28 | End: 2023-11-28 | Stop reason: HOSPADM

## 2023-11-28 RX ORDER — MULTIVITAMIN
1 TABLET ORAL DAILY
COMMUNITY

## 2023-11-28 RX ORDER — ASPIRIN 81 MG/1
81 TABLET ORAL NIGHTLY
COMMUNITY

## 2023-11-28 RX ORDER — ACETAMINOPHEN 500 MG
1000 TABLET ORAL 3 TIMES DAILY
Status: DISCONTINUED | OUTPATIENT
Start: 2023-11-28 | End: 2023-12-11 | Stop reason: HOSPADM

## 2023-11-28 RX ORDER — MIRTAZAPINE 7.5 MG/1
7.5 TABLET, FILM COATED ORAL NIGHTLY
Status: DISCONTINUED | OUTPATIENT
Start: 2023-11-28 | End: 2023-12-11 | Stop reason: HOSPADM

## 2023-11-28 RX ORDER — HYDROMORPHONE HYDROCHLORIDE 2 MG/ML
0.5 INJECTION, SOLUTION INTRAMUSCULAR; INTRAVENOUS; SUBCUTANEOUS EVERY 5 MIN PRN
Status: DISCONTINUED | OUTPATIENT
Start: 2023-11-28 | End: 2023-11-28 | Stop reason: HOSPADM

## 2023-11-28 RX ORDER — KETOROLAC TROMETHAMINE 30 MG/ML
15 INJECTION, SOLUTION INTRAMUSCULAR; INTRAVENOUS EVERY 6 HOURS PRN
Status: DISCONTINUED | OUTPATIENT
Start: 2023-11-28 | End: 2023-11-28 | Stop reason: HOSPADM

## 2023-11-28 RX ORDER — ATORVASTATIN CALCIUM 40 MG/1
40 TABLET, FILM COATED ORAL NIGHTLY
Status: DISCONTINUED | OUTPATIENT
Start: 2023-11-28 | End: 2023-12-11 | Stop reason: HOSPADM

## 2023-11-28 RX ORDER — ONDANSETRON 2 MG/ML
INJECTION INTRAMUSCULAR; INTRAVENOUS
Status: DISCONTINUED | OUTPATIENT
Start: 2023-11-28 | End: 2023-11-28

## 2023-11-28 RX ORDER — DOCUSATE SODIUM 100 MG/1
200 CAPSULE, LIQUID FILLED ORAL 2 TIMES DAILY
COMMUNITY

## 2023-11-28 RX ADMIN — ACETAMINOPHEN 1000 MG: 500 TABLET, FILM COATED ORAL at 10:11

## 2023-11-28 RX ADMIN — FENTANYL CITRATE 50 MCG: 50 INJECTION INTRAMUSCULAR; INTRAVENOUS at 03:11

## 2023-11-28 RX ADMIN — LINEZOLID 600 MG: 600 INJECTION, SOLUTION INTRAVENOUS at 08:11

## 2023-11-28 RX ADMIN — PIPERACILLIN AND TAZOBACTAM 4.5 G: 4; .5 INJECTION, POWDER, FOR SOLUTION INTRAVENOUS; PARENTERAL at 08:11

## 2023-11-28 RX ADMIN — DOCUSATE SODIUM 200 MG: 100 CAPSULE, LIQUID FILLED ORAL at 08:11

## 2023-11-28 RX ADMIN — FERROUS SULFATE TAB 325 MG (65 MG ELEMENTAL FE) 1 EACH: 325 (65 FE) TAB at 08:11

## 2023-11-28 RX ADMIN — ASPIRIN 81 MG: 81 TABLET, COATED ORAL at 08:11

## 2023-11-28 RX ADMIN — MEMANTINE 10 MG: 5 TABLET ORAL at 08:11

## 2023-11-28 RX ADMIN — TICAGRELOR 90 MG: 90 TABLET ORAL at 08:11

## 2023-11-28 RX ADMIN — PIPERACILLIN AND TAZOBACTAM 4.5 G: 4; .5 INJECTION, POWDER, FOR SOLUTION INTRAVENOUS; PARENTERAL at 12:11

## 2023-11-28 RX ADMIN — MUPIROCIN: 20 OINTMENT TOPICAL at 08:11

## 2023-11-28 RX ADMIN — SODIUM CHLORIDE: 9 INJECTION, SOLUTION INTRAVENOUS at 08:11

## 2023-11-28 RX ADMIN — OXYCODONE HYDROCHLORIDE 5 MG: 5 TABLET ORAL at 10:11

## 2023-11-28 RX ADMIN — DONEPEZIL HYDROCHLORIDE 10 MG: 5 TABLET, FILM COATED ORAL at 08:11

## 2023-11-28 RX ADMIN — MIRTAZAPINE 7.5 MG: 7.5 TABLET, FILM COATED ORAL at 08:11

## 2023-11-28 RX ADMIN — GLYCOPYRROLATE 0.2 MG: 0.2 INJECTION INTRAMUSCULAR; INTRAVENOUS at 03:11

## 2023-11-28 RX ADMIN — ONDANSETRON 4 MG: 2 INJECTION INTRAMUSCULAR; INTRAVENOUS at 03:11

## 2023-11-28 RX ADMIN — ATORVASTATIN CALCIUM 40 MG: 40 TABLET, FILM COATED ORAL at 08:11

## 2023-11-28 RX ADMIN — PROPOFOL 100 MG: 10 INJECTION, EMULSION INTRAVENOUS at 03:11

## 2023-11-28 RX ADMIN — CEFAZOLIN 2 G: 1 INJECTION, POWDER, FOR SOLUTION INTRAMUSCULAR; INTRAVENOUS; PARENTERAL at 03:11

## 2023-11-28 RX ADMIN — LIDOCAINE HYDROCHLORIDE 60 MG: 20 INJECTION, SOLUTION INTRAVENOUS at 03:11

## 2023-11-28 RX ADMIN — SODIUM CHLORIDE, POTASSIUM CHLORIDE, SODIUM LACTATE AND CALCIUM CHLORIDE: 600; 310; 30; 20 INJECTION, SOLUTION INTRAVENOUS at 08:11

## 2023-11-28 RX ADMIN — VANCOMYCIN HYDROCHLORIDE 1500 MG: 1 INJECTION, POWDER, LYOPHILIZED, FOR SOLUTION INTRAVENOUS at 09:11

## 2023-11-28 NOTE — DISCHARGE SUMMARY
[facility] - Periop Services  Discharge Note  Short Stay    Procedure(s) (LRB):  AMPUTATION, TOE (Left)      OUTCOME: Patient tolerated treatment/procedure well without complication and is now ready for discharge.  Status post left great toe amputation DC to [facility]  DISPOSITION: Home or Self Care    FINAL DIAGNOSIS:  PVD (peripheral vascular disease)    FOLLOWUP: In clinic    DISCHARGE INSTRUCTIONS:    Discharge Procedure Orders   Diet Adult Regular     Lifting restrictions     No driving until:   Order Comments: Follow up in clinic or while requiring to take pain medication     Notify your health care provider if you experience any of the following:  temperature >100.4     Notify your health care provider if you experience any of the following:  redness, tenderness, or signs of infection (pain, swelling, redness, odor or green/yellow discharge around incision site)     Activity as tolerated     Shower on day dressing removed (No bath)        TIME SPENT ON DISCHARGE: 15 minutes

## 2023-11-28 NOTE — OR NURSING
1530 REC'D TO PACU IN STABLE COND. PT SLEEPING, WAKES TO VOICE. CONNECTED TO BP CUFF, EKG LEADS & SPO2 MONITORS. VS STABLE PT RESTING @ THIS TIME. NO DISTRESS NOTED @ THIS TIME. WILL CONT TO MONITOR.        1615 TRANSFERRED PT TO ROOM 178 IN STABLE COND. ASSISTED TO HOSPITAL BED.  BEDSIDE REPORT GIVEN TO STEVEN JUNG. NO DISTRESS NOTED@ THIS TIME. VS STABLE  100 % 97.6  78 133/74

## 2023-11-28 NOTE — H&P
[facility] - Emergency Department  General Surgery  History & Physical    Patient Name: Riley Fox  MRN: [id number]  Admission Date: 11/27/2023  Attending Physician: No att. providers found   Primary Care Provider: No, Primary Doctor    Patient information was obtained from ER records.     Subjective:     Chief Complaint/Reason for Admission: left great toe abscess likely osteomyelitis    History of Present Illness: Referred to the ER from vascular clinic for IV antibiotics needs amputation left great toe  He is a resident at [facility] wheelchair-bound due to spinal stenosis  Outside arterial duplex suggest moderate disease no stenosis or occlusions    No current facility-administered medications on file prior to encounter.     No current outpatient medications on file prior to encounter.       Review of patient's allergies indicates:  No Known Allergies    No past medical history on file.  No past surgical history on file.  Family History    None       Tobacco Use    Smoking status: Not on file    Smokeless tobacco: Not on file   Substance and Sexual Activity    Alcohol use: Not on file    Drug use: Not on file    Sexual activity: Not on file     Review of Systems   Skin:  Positive for wound.     Objective:     Vital Signs (Most Recent):  Temp: 98.1 °F (36.7 °C) (11/28/23 0208)  Pulse: 61 (11/28/23 0618)  Resp: 18 (11/27/23 1911)  BP: (!) 123/49 (11/28/23 0618)  SpO2: 100 % (11/28/23 0618) Vital Signs (24h Range):  Temp:  [97.5 °F (36.4 °C)-98.1 °F (36.7 °C)] 98.1 °F (36.7 °C)  Pulse:  [] 61  Resp:  [18] 18  SpO2:  [97 %-100 %] 100 %  BP: ()/(47-72) 123/49     Weight: 77.1 kg (170 lb)  Body mass index is 21.83 kg/m².     Physical Exam  Vitals and nursing note reviewed.   Constitutional:       Appearance: Normal appearance.   HENT:      Head: Normocephalic.      Mouth/Throat:      Mouth: Mucous membranes are moist.   Eyes:      Conjunctiva/sclera: Conjunctivae normal.  "  Cardiovascular:      Rate and Rhythm: Normal rate and regular rhythm.   Pulmonary:      Effort: Pulmonary effort is normal.   Abdominal:      Palpations: Abdomen is soft.   Skin:     General: Skin is warm and dry.      Comments: Left great toe edematous purelent  drainage, cellulitus   Neurological:      Mental Status: He is alert and oriented to person, place, and time.   Psychiatric:         Mood and Affect: Mood normal.            I have reviewed all pertinent lab results within the past 24 hours.  CBC: No results for input(s): "WBC", "RBC", "HGB", "HCT", "PLT", "MCV", "MCH", "MCHC" in the last 168 hours.  BMP: No results for input(s): "GLU", "NA", "K", "CL", "CO2", "BUN", "CREATININE", "CALCIUM", "MG" in the last 168 hours.  CMP: No results for input(s): "GLU", "CALCIUM", "ALBUMIN", "PROT", "NA", "K", "CO2", "CL", "BUN", "CREATININE", "ALKPHOS", "ALT", "AST", "BILITOT" in the last 168 hours.  LFTs: No results for input(s): "ALT", "AST", "ALKPHOS", "BILITOT", "PROT", "ALBUMIN" in the last 168 hours.    Significant Diagnostics:  I have reviewed all pertinent imaging results/findings within the past 24 hours.    Assessment/Plan:     PVD (peripheral vascular disease)  Duplex without vascular occlusions patient is nonambulatory, no further vascular work up    Abscess, toe, left  LEFT Great toe amp in OR dr brooke  Check cbc/bmp    Cellulitis  Iv zosyn, zyvoxx       VTE Risk Mitigation (From admission, onward)           Ordered     heparin (porcine) injection 5,000 Units  Every 8 hours         11/28/23 0757     IP VTE HIGH RISK PATIENT  Once         11/28/23 0757     Place sequential compression device  Until discontinued         11/28/23 0757     Place sequential compression device  Until discontinued         11/27/23 1851     Place UMA hose  Until discontinued         11/27/23 1851                    N/A  No family history on file.    Paulina Rodríguez, [facility]  General Surgery  [facility] - Emergency " Department

## 2023-11-28 NOTE — HPI
Referred to the ER from vascular clinic for IV antibiotics needs amputation left great toe  He is a resident at [facility] wheelchair-bound due to spinal stenosis  Outside arterial duplex suggest moderate disease no stenosis or occlusions

## 2023-11-28 NOTE — TRANSFER OF CARE
"Anesthesia Transfer of Care Note    Patient: Riley Fox    Procedure(s) Performed: Procedure(s) (LRB):  AMPUTATION, TOE (Left)    Patient location: PACU    Anesthesia Type: general    Transport from OR: Transported from OR on 2-3 L/min O2 by NC with adequate spontaneous ventilation    Post pain: adequate analgesia    Post assessment: no apparent anesthetic complications and tolerated procedure well    Post vital signs: stable    Level of consciousness: responds to stimulation    Nausea/Vomiting: no nausea/vomiting    Complications: none    Transfer of care protocol was followed      Last vitals: Visit Vitals  BP (!) 90/45 (BP Location: Left arm, Patient Position: Lying)   Pulse 83   Temp 36.4 °C (97.5 °F) (Oral)   Resp 12   Ht 6' 1" (1.854 m)   Wt 75.3 kg (166 lb)   SpO2 100%   BMI 21.90 kg/m²     "

## 2023-11-28 NOTE — INTERVAL H&P NOTE
The patient has been examined and the H&P has been reviewed:    I concur with the findings and no changes have occurred since H&P was written.    Anesthesia/Surgery risks, benefits and alternative options discussed and understood by patient/family.    Left great toe amputation      Active Hospital Problems    Diagnosis  POA    *PVD (peripheral vascular disease) [I73.9]  Yes    Abscess, toe, left [L02.612]  Yes    Cellulitis [L03.90]  Yes      Resolved Hospital Problems   No resolved problems to display.

## 2023-11-28 NOTE — PHARMACY MED REC
"Admission Medication History     The home medication history was taken by Irina Acevedo.    You may go to "Admission" then "Reconcile Home Medications" tabs to review and/or act upon these items.     The home medication list has been updated by the Pharmacy department.   Please read ALL comments highlighted in yellow.   Please address this information as you see fit.    Feel free to contact us if you have any questions or require assistance.  The patient is from [facility]. I called the facility and they faxed over the patient's MAR with information on his medications and the last dosing.  All medications listed below were added directly from [facility], with collaboration from the MAR.  OTC medications were added by me, with collaboration from the MAR.        Medications listed below were obtained from: Nursing home  (Not in a hospital admission)        Current Outpatient Medications on File Prior to Encounter   Medication Sig Dispense Refill Last Dose    aspirin (ECOTRIN) 81 MG EC tablet Take 81 mg by mouth every evening.   11/27/2023    BRILINTA 90 mg tablet Take 90 mg by mouth 2 (two) times daily.   11/27/2023    carvediloL (COREG) 6.25 MG tablet Take 6.25 mg by mouth once daily.   11/27/2023    cyanocobalamin (VITAMIN B-12) 500 MCG tablet Take 4 tablets by mouth once daily.   11/27/2023    docusate sodium (COLACE) 100 MG capsule Take 200 mg by mouth 2 (two) times daily.   11/27/2023    donepeziL (ARICEPT) 10 MG tablet Take 10 mg by mouth every evening.   11/27/2023    famotidine (PEPCID) 20 MG tablet Take 20 mg by mouth once daily.   11/27/2023    HYDROcodone-acetaminophen (NORCO) 5-325 mg per tablet Take 1 tablet by mouth every evening.   11/27/2023    levoFLOXacin (LEVAQUIN) 500 MG tablet Take 500 mg by mouth once daily.   11/27/2023    lisinopriL 10 MG tablet Take 10 mg by mouth once daily.   11/27/2023    melatonin 3 mg Cap Take 6 mg by mouth every evening.   11/27/2023    memantine (NAMENDA) 10 MG " Tab Take 10 mg by mouth 2 (two) times daily.   11/27/2023    mirtazapine (REMERON) 7.5 MG Tab Take 7.5 mg by mouth nightly.   11/27/2023    multivitamin (THERAGRAN) per tablet Take 1 tablet by mouth once daily.   11/27/2023    mupirocin (BACTROBAN) 2 % ointment Apply topically once daily.   11/27/2023    nystatin (MYCOSTATIN) 100,000 unit/mL suspension Take 10 mLs by mouth 3 (three) times daily.   11/27/2023    oxybutynin (DITROPAN-XL) 10 MG 24 hr tablet Take 10 mg by mouth every evening.   11/27/2023    pantoprazole (PROTONIX) 20 MG tablet Take 20 mg by mouth once daily.   11/27/2023    polyethylene glycol (GLYCOLAX) 17 gram/dose powder Take 17 g by mouth once daily.   11/27/2023    polyvinyl alcohol, artificial tears, (LIQUIFILM TEARS) 1.4 % ophthalmic solution Place 1 drop into both eyes 2 (two) times daily as needed.   11/27/2023    promethazine (PHENERGAN) 25 mg/mL injection Inject 12.5 mg into the muscle every 4 (four) hours as needed.   11/27/2023    rOPINIRole (REQUIP) 0.5 MG tablet Take 0.5 mg by mouth every evening.   11/27/2023    rosuvastatin (CRESTOR) 20 MG tablet Take 10 mg by mouth every evening.   11/27/2023    vit C/E/Zn/coppr/lutein/zeaxan (PRESERVISION AREDS-2 ORAL) Take 1 capsule by mouth 2 (two) times a day.   11/27/2023    ferrous sulfate (FEOSOL) Tab tablet Take 1 tablet by mouth 2 (two) times daily.            Potential issues to be addressed PRIOR TO DISCHARGE  No issues identified.    Irina Acevedo  Pharmacy Tech Specialist - Medication History  [phone]. [phone]    .

## 2023-11-28 NOTE — HPI
Head, normocephalic, atraumatic.Mouth and Throat, Oral cavity appearance normal, Appearance normal Patient is a 83-year-old male with past medical history of hypertension, spinal stenosis, peripheral arterial disease, questionable dementia, chronic kidney disease, who presents to the emergency room for evaluation of left great toe osteomyelitis along with cellulitis of left lower extremity.  Patient is a long-term resident at the nursing home secondary to his spinal stenosis and resulting immobility, it appears as though he has some contractures of his lower extremity, his son is at bedside, he tells me that the patient has had some memory issues lately, he also tells me that he has history of CVA in the past.  Patient endorses pain in bilateral lower extremities, mostly on the affected side that is the left great toe, although the right lower extremity also appears to have some wounds.  General surgery evaluated the patient in the ER and recommended the patient to take to the OR for debridement and possible amputation of the affected toe, followed by admission to [facility] for long-term wound care and IV antibiotics.  Patient otherwise is hemodynamically stable, denies any chest pain, denies any shortness of breath, denies any fevers chills nausea vomiting or diarrhea.

## 2023-11-28 NOTE — SUBJECTIVE & OBJECTIVE
"No current facility-administered medications on file prior to encounter.     No current outpatient medications on file prior to encounter.       Review of patient's allergies indicates:  No Known Allergies    No past medical history on file.  No past surgical history on file.  Family History    None       Tobacco Use    Smoking status: Not on file    Smokeless tobacco: Not on file   Substance and Sexual Activity    Alcohol use: Not on file    Drug use: Not on file    Sexual activity: Not on file     Review of Systems   Skin:  Positive for wound.     Objective:     Vital Signs (Most Recent):  Temp: 98.1 °F (36.7 °C) (11/28/23 0208)  Pulse: 61 (11/28/23 0618)  Resp: 18 (11/27/23 1911)  BP: (!) 123/49 (11/28/23 0618)  SpO2: 100 % (11/28/23 0618) Vital Signs (24h Range):  Temp:  [97.5 °F (36.4 °C)-98.1 °F (36.7 °C)] 98.1 °F (36.7 °C)  Pulse:  [] 61  Resp:  [18] 18  SpO2:  [97 %-100 %] 100 %  BP: ()/(47-72) 123/49     Weight: 77.1 kg (170 lb)  Body mass index is 21.83 kg/m².     Physical Exam  Vitals and nursing note reviewed.   Constitutional:       Appearance: Normal appearance.   HENT:      Head: Normocephalic.      Mouth/Throat:      Mouth: Mucous membranes are moist.   Eyes:      Conjunctiva/sclera: Conjunctivae normal.   Cardiovascular:      Rate and Rhythm: Normal rate and regular rhythm.   Pulmonary:      Effort: Pulmonary effort is normal.   Abdominal:      Palpations: Abdomen is soft.   Skin:     General: Skin is warm and dry.      Comments: Left great toe edematous purelent  drainage, cellulitus   Neurological:      Mental Status: He is alert and oriented to person, place, and time.   Psychiatric:         Mood and Affect: Mood normal.            I have reviewed all pertinent lab results within the past 24 hours.  CBC: No results for input(s): "WBC", "RBC", "HGB", "HCT", "PLT", "MCV", "MCH", "MCHC" in the last 168 hours.  BMP: No results for input(s): "GLU", "NA", "K", "CL", "CO2", "BUN", " ""CREATININE", "CALCIUM", "MG" in the last 168 hours.  CMP: No results for input(s): "GLU", "CALCIUM", "ALBUMIN", "PROT", "NA", "K", "CO2", "CL", "BUN", "CREATININE", "ALKPHOS", "ALT", "AST", "BILITOT" in the last 168 hours.  LFTs: No results for input(s): "ALT", "AST", "ALKPHOS", "BILITOT", "PROT", "ALBUMIN" in the last 168 hours.    Significant Diagnostics:  I have reviewed all pertinent imaging results/findings within the past 24 hours.    "

## 2023-11-28 NOTE — SUBJECTIVE & OBJECTIVE
No past medical history on file.    No past surgical history on file.    Review of patient's allergies indicates:   Allergen Reactions    Ether        Current Facility-Administered Medications on File Prior to Encounter   Medication    acetaminophen tablet 650 mg    acetaminophen tablet 650 mg    albuterol-ipratropium 2.5 mg-0.5 mg/3 mL nebulizer solution 3 mL    diphenhydrAMINE injection 25 mg    heparin (porcine) injection 5,000 Units    HYDROcodone-acetaminophen  mg per tablet 1 tablet    HYDROmorphone (PF) injection 0.5 mg    ketorolac injection 15 mg    lactated ringers infusion    linezolid 600 mg/300 mL IVPB 600 mg    melatonin tablet 6 mg    melatonin tablet 6 mg    meperidine (PF) injection 25 mg    metoclopramide HCl injection 5 mg    morphine injection 2 mg    morphine injection 4 mg    mupirocin 2 % ointment    ondansetron injection 4 mg    ondansetron injection 4 mg    ondansetron injection 4 mg    piperacillin-tazobactam (ZOSYN) 4.5 g in dextrose 5 % in water (D5W) 100 mL IVPB (MB+)    [COMPLETED] piperacillin-tazobactam (ZOSYN) 4.5 g in dextrose 5 % in water (D5W) 100 mL IVPB (MB+)    prochlorperazine injection Soln 5 mg    sodium chloride 0.9% flush 10 mL    vancomycin (VANCOCIN) 1,500 mg in dextrose 5 % (D5W) 250 mL IVPB     Current Outpatient Medications on File Prior to Encounter   Medication Sig    aspirin (ECOTRIN) 81 MG EC tablet Take 81 mg by mouth every evening.    BRILINTA 90 mg tablet Take 90 mg by mouth 2 (two) times daily.    carvediloL (COREG) 6.25 MG tablet Take 6.25 mg by mouth once daily.    cyanocobalamin (VITAMIN B-12) 500 MCG tablet Take 4 tablets by mouth once daily.    docusate sodium (COLACE) 100 MG capsule Take 200 mg by mouth 2 (two) times daily.    donepeziL (ARICEPT) 10 MG tablet Take 10 mg by mouth every evening.    famotidine (PEPCID) 20 MG tablet Take 20 mg by mouth once daily.    ferrous sulfate (FEOSOL) Tab tablet Take 1 tablet by mouth 2 (two) times daily.     HYDROcodone-acetaminophen (NORCO) 5-325 mg per tablet Take 1 tablet by mouth every evening.    levoFLOXacin (LEVAQUIN) 500 MG tablet Take 500 mg by mouth once daily.    lisinopriL 10 MG tablet Take 10 mg by mouth once daily.    melatonin 3 mg Cap Take 6 mg by mouth every evening.    memantine (NAMENDA) 10 MG Tab Take 10 mg by mouth 2 (two) times daily.    mirtazapine (REMERON) 7.5 MG Tab Take 7.5 mg by mouth nightly.    multivitamin (THERAGRAN) per tablet Take 1 tablet by mouth once daily.    mupirocin (BACTROBAN) 2 % ointment Apply topically once daily.    nystatin (MYCOSTATIN) 100,000 unit/mL suspension Take 10 mLs by mouth 3 (three) times daily.    oxybutynin (DITROPAN-XL) 10 MG 24 hr tablet Take 10 mg by mouth every evening.    pantoprazole (PROTONIX) 20 MG tablet Take 20 mg by mouth once daily.    polyethylene glycol (GLYCOLAX) 17 gram/dose powder Take 17 g by mouth once daily.    polyvinyl alcohol, artificial tears, (LIQUIFILM TEARS) 1.4 % ophthalmic solution Place 1 drop into both eyes 2 (two) times daily as needed.    promethazine (PHENERGAN) 25 mg/mL injection Inject 12.5 mg into the muscle every 4 (four) hours as needed.    rOPINIRole (REQUIP) 0.5 MG tablet Take 0.5 mg by mouth every evening.    rosuvastatin (CRESTOR) 20 MG tablet Take 10 mg by mouth every evening.    vit C/E/Zn/coppr/lutein/zeaxan (PRESERVISION AREDS-2 ORAL) Take 1 capsule by mouth 2 (two) times a day.     Family History    None       Tobacco Use    Smoking status: Not on file    Smokeless tobacco: Not on file   Substance and Sexual Activity    Alcohol use: Not on file    Drug use: Not on file    Sexual activity: Not on file     Review of Systems   Constitutional:  Positive for appetite change and fatigue.   HENT: Negative.     Eyes: Negative.    Respiratory: Negative.     Cardiovascular: Negative.    Gastrointestinal: Negative.    Endocrine: Negative.    Genitourinary: Negative.    Musculoskeletal:  Positive for arthralgias.   Skin:   Positive for wound.   Allergic/Immunologic: Negative.    Neurological: Negative.    Hematological: Negative.    Psychiatric/Behavioral: Negative.       Objective:     Vital Signs (Most Recent):    Vital Signs (24h Range):  Temp:  [97.5 °F (36.4 °C)-98.1 °F (36.7 °C)] 97.5 °F (36.4 °C)  Pulse:  [] 83  Resp:  [12-18] 12  SpO2:  [97 %-100 %] 100 %  BP: ()/(45-72) 90/45     Weight: 77.1 kg (169 lb 15.6 oz)  Body mass index is 22.43 kg/m².     Physical Exam  Vitals and nursing note reviewed.   Constitutional:       Appearance: Normal appearance.   HENT:      Head: Normocephalic.      Mouth/Throat:      Mouth: Mucous membranes are moist.   Eyes:      Conjunctiva/sclera: Conjunctivae normal.   Cardiovascular:      Rate and Rhythm: Normal rate and regular rhythm.   Pulmonary:      Effort: Pulmonary effort is normal.   Abdominal:      Palpations: Abdomen is soft.   Skin:     General: Skin is warm and dry.      Comments: Left great toe edematous purelent  drainage, cellulitus   Neurological:      Mental Status: He is alert and oriented to person, place, and time.   Psychiatric:         Mood and Affect: Mood normal.                Significant Labs: All pertinent labs within the past 24 hours have been reviewed.    Significant Imaging: I have reviewed all pertinent imaging results/findings within the past 24 hours.

## 2023-11-28 NOTE — ANESTHESIA PROCEDURE NOTES
Intubation    Date/Time: 11/28/2023 3:05 PM    Performed by: Gabe Garcia CRNA  Authorized by: Noel Browning MD    Intubation:     Induction:  Intravenous    Intubated:  Postinduction    Mask Ventilation:  Easy with oral airway    Attempts:  1    Attempted By:  CRNA    Difficult Airway Encountered?: No      Complications:  None    Airway Device:  Supraglottic airway/LMA    Airway Device Size:  4.0    Style/Cuff Inflation:  Cuffed (inflated to minimal occlusive pressure)    Placement Verified By:  Capnometry    Complicating Factors:  None    Findings Post-Intubation:  BS equal bilateral and atraumatic/condition of teeth unchanged

## 2023-11-28 NOTE — ASSESSMENT & PLAN NOTE
Creatine stable for now. BMP reviewed- noted Estimated Creatinine Clearance: 32.9 mL/min (A) (based on SCr of 1.92 mg/dL (H)). according to latest data. Based on current GFR, CKD stage is stage 5 - GFR < 15.  Monitor UOP and serial BMP and adjust therapy as needed. Renally dose meds. Avoid nephrotoxic medications and procedures.

## 2023-11-28 NOTE — H&P
[facility] Hospital Medicine  History & Physical    Patient Name: Riley Fox  MRN: [id number]  Patient Class: IP- Inpatient  Admission Date: (Not on file)  Attending Physician: Kezia Talavera MD   Primary Care Provider: Paulette Primary Doctor         Patient information was obtained from relative(s) and ER records.     Subjective:     Principal Problem:Abscess, toe, left    Chief Complaint: No chief complaint on file.       HPI: Patient is a 83-year-old male with past medical history of hypertension, spinal stenosis, peripheral arterial disease, questionable dementia, chronic kidney disease, who presents to the emergency room for evaluation of left great toe osteomyelitis along with cellulitis of left lower extremity.  Patient is a long-term resident at the nursing home secondary to his spinal stenosis and resulting immobility, it appears as though he has some contractures of his lower extremity, his son is at bedside, he tells me that the patient has had some memory issues lately, he also tells me that he has history of CVA in the past.  Patient endorses pain in bilateral lower extremities, mostly on the affected side that is the left great toe, although the right lower extremity also appears to have some wounds.  General surgery evaluated the patient in the ER and recommended the patient to take to the OR for debridement and possible amputation of the affected toe, followed by admission to [facility] for long-term wound care and IV antibiotics.  Patient otherwise is hemodynamically stable, denies any chest pain, denies any shortness of breath, denies any fevers chills nausea vomiting or diarrhea.    No past medical history on file.    No past surgical history on file.    Review of patient's allergies indicates:   Allergen Reactions    Ether        Current Facility-Administered Medications on File Prior to Encounter   Medication     acetaminophen tablet 650 mg    acetaminophen tablet 650 mg    albuterol-ipratropium 2.5 mg-0.5 mg/3 mL nebulizer solution 3 mL    diphenhydrAMINE injection 25 mg    heparin (porcine) injection 5,000 Units    HYDROcodone-acetaminophen  mg per tablet 1 tablet    HYDROmorphone (PF) injection 0.5 mg    ketorolac injection 15 mg    lactated ringers infusion    linezolid 600 mg/300 mL IVPB 600 mg    melatonin tablet 6 mg    melatonin tablet 6 mg    meperidine (PF) injection 25 mg    metoclopramide HCl injection 5 mg    morphine injection 2 mg    morphine injection 4 mg    mupirocin 2 % ointment    ondansetron injection 4 mg    ondansetron injection 4 mg    ondansetron injection 4 mg    piperacillin-tazobactam (ZOSYN) 4.5 g in dextrose 5 % in water (D5W) 100 mL IVPB (MB+)    [COMPLETED] piperacillin-tazobactam (ZOSYN) 4.5 g in dextrose 5 % in water (D5W) 100 mL IVPB (MB+)    prochlorperazine injection Soln 5 mg    sodium chloride 0.9% flush 10 mL    vancomycin (VANCOCIN) 1,500 mg in dextrose 5 % (D5W) 250 mL IVPB     Current Outpatient Medications on File Prior to Encounter   Medication Sig    aspirin (ECOTRIN) 81 MG EC tablet Take 81 mg by mouth every evening.    BRILINTA 90 mg tablet Take 90 mg by mouth 2 (two) times daily.    carvediloL (COREG) 6.25 MG tablet Take 6.25 mg by mouth once daily.    cyanocobalamin (VITAMIN B-12) 500 MCG tablet Take 4 tablets by mouth once daily.    docusate sodium (COLACE) 100 MG capsule Take 200 mg by mouth 2 (two) times daily.    donepeziL (ARICEPT) 10 MG tablet Take 10 mg by mouth every evening.    famotidine (PEPCID) 20 MG tablet Take 20 mg by mouth once daily.    ferrous sulfate (FEOSOL) Tab tablet Take 1 tablet by mouth 2 (two) times daily.    HYDROcodone-acetaminophen (NORCO) 5-325 mg per tablet Take 1 tablet by mouth every evening.    levoFLOXacin (LEVAQUIN) 500 MG tablet Take 500 mg by mouth once daily.    lisinopriL 10 MG tablet Take 10 mg by mouth once daily.    melatonin 3  mg Cap Take 6 mg by mouth every evening.    memantine (NAMENDA) 10 MG Tab Take 10 mg by mouth 2 (two) times daily.    mirtazapine (REMERON) 7.5 MG Tab Take 7.5 mg by mouth nightly.    multivitamin (THERAGRAN) per tablet Take 1 tablet by mouth once daily.    mupirocin (BACTROBAN) 2 % ointment Apply topically once daily.    nystatin (MYCOSTATIN) 100,000 unit/mL suspension Take 10 mLs by mouth 3 (three) times daily.    oxybutynin (DITROPAN-XL) 10 MG 24 hr tablet Take 10 mg by mouth every evening.    pantoprazole (PROTONIX) 20 MG tablet Take 20 mg by mouth once daily.    polyethylene glycol (GLYCOLAX) 17 gram/dose powder Take 17 g by mouth once daily.    polyvinyl alcohol, artificial tears, (LIQUIFILM TEARS) 1.4 % ophthalmic solution Place 1 drop into both eyes 2 (two) times daily as needed.    promethazine (PHENERGAN) 25 mg/mL injection Inject 12.5 mg into the muscle every 4 (four) hours as needed.    rOPINIRole (REQUIP) 0.5 MG tablet Take 0.5 mg by mouth every evening.    rosuvastatin (CRESTOR) 20 MG tablet Take 10 mg by mouth every evening.    vit C/E/Zn/coppr/lutein/zeaxan (PRESERVISION AREDS-2 ORAL) Take 1 capsule by mouth 2 (two) times a day.     Family History    None       Tobacco Use    Smoking status: Not on file    Smokeless tobacco: Not on file   Substance and Sexual Activity    Alcohol use: Not on file    Drug use: Not on file    Sexual activity: Not on file     Review of Systems   Constitutional:  Positive for appetite change and fatigue.   HENT: Negative.     Eyes: Negative.    Respiratory: Negative.     Cardiovascular: Negative.    Gastrointestinal: Negative.    Endocrine: Negative.    Genitourinary: Negative.    Musculoskeletal:  Positive for arthralgias.   Skin:  Positive for wound.   Allergic/Immunologic: Negative.    Neurological: Negative.    Hematological: Negative.    Psychiatric/Behavioral: Negative.       Objective:     Vital Signs (Most Recent):    Vital Signs (24h Range):  Temp:  [97.5 °F  (36.4 °C)-98.1 °F (36.7 °C)] 97.5 °F (36.4 °C)  Pulse:  [] 83  Resp:  [12-18] 12  SpO2:  [97 %-100 %] 100 %  BP: ()/(45-72) 90/45     Weight: 77.1 kg (169 lb 15.6 oz)  Body mass index is 22.43 kg/m².     Physical Exam  Vitals and nursing note reviewed.   Constitutional:       Appearance: Normal appearance.   HENT:      Head: Normocephalic.      Mouth/Throat:      Mouth: Mucous membranes are moist.   Eyes:      Conjunctiva/sclera: Conjunctivae normal.   Cardiovascular:      Rate and Rhythm: Normal rate and regular rhythm.   Pulmonary:      Effort: Pulmonary effort is normal.   Abdominal:      Palpations: Abdomen is soft.   Skin:     General: Skin is warm and dry.      Comments: Left great toe edematous purelent  drainage, cellulitus   Neurological:      Mental Status: He is alert and oriented to person, place, and time.   Psychiatric:         Mood and Affect: Mood normal.                Significant Labs: All pertinent labs within the past 24 hours have been reviewed.    Significant Imaging: I have reviewed all pertinent imaging results/findings within the past 24 hours.  Assessment/Plan:     * Abscess, toe, left  Pt seen in ER for surgical eval, gen surg recommend iv abx and OR  Pt to be admitted to [facility] post intervention  Cont iv abs  Wound care  Follow cx    CKD (chronic kidney disease), stage V  Creatine stable for now. BMP reviewed- noted Estimated Creatinine Clearance: 32.9 mL/min (A) (based on SCr of 1.92 mg/dL (H)). according to latest data. Based on current GFR, CKD stage is stage 5 - GFR < 15.  Monitor UOP and serial BMP and adjust therapy as needed. Renally dose meds. Avoid nephrotoxic medications and procedures.    Tremor  Requip home dose      Dementia  Cont medications being taken at NH    HTN (hypertension)  Cont home meds  Chronic controlled    PVD (peripheral vascular disease)  Cont DAPT  Statin      Cellulitis  Cont iv abx        VTE Risk Mitigation (From admission, onward)      None                             Kezia Talavera MD  Department of Hospital Medicine  [facility]

## 2023-11-28 NOTE — ANESTHESIA PREPROCEDURE EVALUATION
11/28/2023  Riley Fox is a 83 y.o., male.      Pre-op Assessment    I have reviewed the Patient Summary Reports.     I have reviewed the Nursing Notes. I have reviewed the NPO Status.   I have reviewed the Medications.     Review of Systems  Anesthesia Hx:             Denies Family Hx of Anesthesia complications.    Denies Personal Hx of Anesthesia complications.                    Social:  Non-Smoker, No Alcohol Use       Hematology/Oncology:  Hematology Normal   Oncology Normal                                   EENT/Dental:  EENT/Dental Normal           Cardiovascular:     Hypertension          PVD    ECG has been reviewed.                          Pulmonary:  Pulmonary Normal                       Renal/:  Renal/ Normal                 Hepatic/GI:  Hepatic/GI Normal                 Musculoskeletal:  Musculoskeletal Normal                Neurological:        Chronic Pain Syndrome Pain Etiology/Diagnosis  , secondary to Spinal Stenosis  Dementia                         Endocrine:  Endocrine Normal            Dermatological:  Skin Normal    Psych:  Psychiatric Normal                    Physical Exam  General: Well nourished, Cooperative, Alert and Oriented    Airway:  Mallampati: II / II  Mouth Opening: Normal  TM Distance: Normal  Neck ROM: Normal ROM    Dental:  Edentulous    Chest/Lungs:  Clear to auscultation    Heart:  Rate: Normal  Rhythm: Regular Rhythm  Sounds: Normal        Chemistry        Component Value Date/Time     11/28/2023 0814    K 4.6 11/28/2023 0814     (H) 11/28/2023 0814    CO2 23 11/28/2023 0814    BUN 40 (H) 11/28/2023 0814    CREATININE 1.92 (H) 11/28/2023 0814    GLU 92 11/28/2023 0814        Component Value Date/Time    CALCIUM 9.0 11/28/2023 0814    ALKPHOS 80 11/16/2023 1020    AST 12 (L) 11/16/2023 1020    ALT 16 11/16/2023 1020    BILITOT 0.3 11/16/2023  1020    ESTGFRAFRICA 117 11/12/2020 0630    EGFRNONAA 97 11/12/2020 0630        Lab Results   Component Value Date    WBC 7.91 11/28/2023    HGB 9.7 (L) 11/28/2023    HCT 32.0 (L) 11/28/2023     11/28/2023     No results found for this or any previous visit.      Anesthesia Plan  Type of Anesthesia, risks & benefits discussed:    Anesthesia Type: Gen Supraglottic Airway  Intra-op Monitoring Plan: Standard ASA Monitors  Post Op Pain Control Plan: multimodal analgesia  Induction:  IV  Airway Plan: Direct  Informed Consent: Informed consent signed with the Patient representative and all parties understand the risks and agree with anesthesia plan.  All questions answered.   ASA Score: 4  Day of Surgery Review of History & Physical: H&P Update referred to the surgeon/provider.I have interviewed and examined the patient. I have reviewed the patient's H&P dated: There are no significant changes.     Ready For Surgery From Anesthesia Perspective.     .

## 2023-11-28 NOTE — ASSESSMENT & PLAN NOTE
Pt seen in ER for surgical eval, gen surg recommend iv abx and OR  Pt to be admitted to [facility] post intervention  Cont iv abs  Wound care  Follow cx

## 2023-11-28 NOTE — PLAN OF CARE
[facility] - Emergency Department  Discharge Final Note    Primary Care Provider: Paulette Primary Doctor    Expected Discharge Date:     Final Discharge Note (most recent)       Final Note - 11/28/23 1354          Final Note    Assessment Type Final Discharge Note     Anticipated Discharge Disposition Long Term Acute Care        Post-Acute Status    Post-Acute Authorization Placement     Post-Acute Placement Status Set-up Complete/Auth obtained     Patient choice form signed by patient/caregiver List with quality metrics by geographic area provided;List from CMS Compare;List from System Post-Acute Care     Discharge Delays None known at this time                     Important Message from Medicare  Important Message from Medicare regarding Discharge Appeal Rights: Given to patient/caregiver, Explained to patient/caregiver, Signed/date by patient/caregiver     Date IMM was signed: 11/28/23  Time IMM was signed: 1030      [facility] - Emergency Department  Initial Discharge Assessment       Primary Care Provider: Paulette Primary Doctor    Admission Diagnosis: PVD (peripheral vascular disease) [I73.9]    Admission Date: 11/27/2023  Expected Discharge Date:     Transition of Care Barriers: None    Payor: MEDICARE / Plan: MEDICARE PART A & B / Product Type: Government /     Extended Emergency Contact Information  Primary Emergency Contact: mike araya  Mobile Phone: [phone]  Relation: Son    Discharge Plan A: Return to nursing home  Discharge Plan B: Return to Nursing Home    No Pharmacies Listed    Initial Assessment (most recent)       Adult Discharge Assessment - 11/28/23 1350          Discharge Assessment    Assessment Type Discharge Planning Assessment     Confirmed/corrected address, phone number and insurance Yes     Confirmed Demographics Correct on Facesheet     Source of Information patient;family     If unable to respond/provide information was family/caregiver contacted? Yes     Contact  Name/Number mike [phone]     Does patient/caregiver understand observation status Yes     Communicated JORI with patient/caregiver Yes     People in Home facility resident     [facility]ility Arrived From[facility]     Do you expect to return to your current living situation? Yes     Do you have help at home or someone to help you manage your care at home? No     Prior to hospitilization cognitive status: Unable to Assess     Current cognitive status: Unable to Assess     Home Layout Able to live on 1st floor     Equipment Currently Used at Home wheelchair     Readmission within 30 days? No     Patient currently being followed by outpatient case management? No     Do you currently have service(s) that help you manage your care at home? No     Do you take prescription medications? Yes     Do you have prescription coverage? Yes     Do you have any problems affording any of your prescribed medications? No     Is the patient taking medications as prescribed? yes     How do you get to doctors appointments? health plan transportation;family or friend will provide     Are you on dialysis? No     Do you take coumadin? No     DME Needed Upon Discharge  none     Discharge Plan discussed with: Adult children     Transition of Care Barriers None     Discharge Plan A Return to nursing home     Discharge Plan B Return to Nursing Home        Physical Activity    On average, how many days per week do you engage in moderate to strenuous exercise (like a brisk walk)? 0 days     On average, how many minutes do you engage in exercise at this level? 0 min        Financial Resource Strain    How hard is it for you to pay for the very basics like food, housing, medical care, and heating? Not hard at all        Housing Stability    In the last 12 months, was there a time when you were not able to pay the mortgage or rent on time? No     In the last 12 months, how many places have you lived? 1     In the last 12 months, was there a time when you  did not have a steady place to sleep or slept in a shelter (including now)? No        Transportation Needs    In the past 12 months, has lack of transportation kept you from medical appointments or from getting medications? No     In the past 12 months, has lack of transportation kept you from meetings, work, or from getting things needed for daily living? No        Food Insecurity    Within the past 12 months, you worried that your food would run out before you got the money to buy more. Never true     Within the past 12 months, the food you bought just didn't last and you didn't have money to get more. Never true        Stress    Do you feel stress - tense, restless, nervous, or anxious, or unable to sleep at night because your mind is troubled all the time - these days? Not at all        Social Connections    In a typical week, how many times do you talk on the phone with family, friends, or neighbors? Never     How often do you get together with friends or relatives? Twice a week     How often do you attend Holiness or [employer]ats[employer] services? Never     How often do you attend meetings of the clubs or organizations you belong to? Never     Are you , , , , never , or living with a partner?         Alcohol Use    Q1: How often do you have a drink containing alcohol? Never     Q2: How many drinks containing alcohol do you have on a typical day when you are drinking? Patient does not drink     Q3: How often do you have six or more drinks on one occasion? Never                   Spoke with son at bedside. Pt will admit to [facility] choice obtained this day. After sx. Pt is a resident of [facility]. Pt will return at [place]. 0 further dc needs.

## 2023-11-29 PROBLEM — S90.812A: Status: ACTIVE | Noted: 2023-11-29

## 2023-11-29 PROBLEM — S91.101A OPEN WOUND OF RIGHT GREAT TOE: Status: ACTIVE | Noted: 2023-11-29

## 2023-11-29 PROBLEM — S91.102A OPEN WOUND OF LEFT GREAT TOE: Status: ACTIVE | Noted: 2023-11-29

## 2023-11-29 PROBLEM — B37.2 YEAST DERMATITIS: Status: ACTIVE | Noted: 2023-11-29

## 2023-11-29 PROBLEM — S90.811A: Status: ACTIVE | Noted: 2023-11-29

## 2023-11-29 PROBLEM — L97.512 NON-PRESSURE CHRONIC ULCER OF OTHER PART OF RIGHT FOOT WITH FAT LAYER EXPOSED: Status: ACTIVE | Noted: 2023-11-29

## 2023-11-29 PROBLEM — L97.522 NON-PRESSURE CHRONIC ULCER OF OTHER PART OF LEFT FOOT WITH FAT LAYER EXPOSED: Status: ACTIVE | Noted: 2023-11-29

## 2023-11-29 LAB
ALBUMIN SERPL BCP-MCNC: 2.6 G/DL (ref 3.5–5)
ALBUMIN/GLOB SERPL: 0.8 {RATIO}
ALP SERPL-CCNC: 81 U/L (ref 45–115)
ALT SERPL W P-5'-P-CCNC: 12 U/L (ref 16–61)
ANION GAP SERPL CALCULATED.3IONS-SCNC: 10 MMOL/L (ref 7–16)
AST SERPL W P-5'-P-CCNC: 9 U/L (ref 15–37)
BILIRUB SERPL-MCNC: 0.3 MG/DL (ref ?–1.2)
BUN SERPL-MCNC: 29 MG/DL (ref 7–18)
BUN/CREAT SERPL: 18 (ref 6–20)
CALCIUM SERPL-MCNC: 8.2 MG/DL (ref 8.5–10.1)
CHLORIDE SERPL-SCNC: 112 MMOL/L (ref 98–107)
CO2 SERPL-SCNC: 24 MMOL/L (ref 21–32)
CREAT SERPL-MCNC: 1.6 MG/DL (ref 0.7–1.3)
EGFR (NO RACE VARIABLE) (RUSH/TITUS): 43 ML/MIN/1.73M2
GLOBULIN SER-MCNC: 3.1 G/DL (ref 2–4)
GLUCOSE SERPL-MCNC: 90 MG/DL (ref 74–106)
MAGNESIUM SERPL-MCNC: 2.3 MG/DL (ref 1.7–2.3)
PHOSPHATE SERPL-MCNC: 3.7 MG/DL (ref 2.5–4.5)
POTASSIUM SERPL-SCNC: 4.7 MMOL/L (ref 3.5–5.1)
PROT SERPL-MCNC: 5.7 G/DL (ref 6.4–8.2)
SODIUM SERPL-SCNC: 141 MMOL/L (ref 136–145)

## 2023-11-29 PROCEDURE — 99900035 HC TECH TIME PER 15 MIN (STAT)

## 2023-11-29 PROCEDURE — 83735 ASSAY OF MAGNESIUM: CPT | Performed by: INTERNAL MEDICINE

## 2023-11-29 PROCEDURE — 92610 EVALUATE SWALLOWING FUNCTION: CPT

## 2023-11-29 PROCEDURE — 99232 PR SUBSEQUENT HOSPITAL CARE,LEVL II: ICD-10-PCS | Mod: ,,, | Performed by: NURSE PRACTITIONER

## 2023-11-29 PROCEDURE — 27000221 HC OXYGEN, UP TO 24 HOURS

## 2023-11-29 PROCEDURE — 25000003 PHARM REV CODE 250: Performed by: INTERNAL MEDICINE

## 2023-11-29 PROCEDURE — 99232 PR SUBSEQUENT HOSPITAL CARE,LEVL II: ICD-10-PCS | Mod: ,,, | Performed by: FAMILY MEDICINE

## 2023-11-29 PROCEDURE — 94761 N-INVAS EAR/PLS OXIMETRY MLT: CPT

## 2023-11-29 PROCEDURE — 84100 ASSAY OF PHOSPHORUS: CPT | Performed by: INTERNAL MEDICINE

## 2023-11-29 PROCEDURE — 11000001 HC ACUTE MED/SURG PRIVATE ROOM

## 2023-11-29 PROCEDURE — 25000003 PHARM REV CODE 250: Performed by: NURSE PRACTITIONER

## 2023-11-29 PROCEDURE — 99232 SBSQ HOSP IP/OBS MODERATE 35: CPT | Mod: ,,, | Performed by: FAMILY MEDICINE

## 2023-11-29 PROCEDURE — 25000003 PHARM REV CODE 250: Performed by: HOSPITALIST

## 2023-11-29 PROCEDURE — 99232 SBSQ HOSP IP/OBS MODERATE 35: CPT | Mod: ,,, | Performed by: NURSE PRACTITIONER

## 2023-11-29 PROCEDURE — 63600175 PHARM REV CODE 636 W HCPCS: Performed by: INTERNAL MEDICINE

## 2023-11-29 PROCEDURE — 80053 COMPREHEN METABOLIC PANEL: CPT | Performed by: INTERNAL MEDICINE

## 2023-11-29 PROCEDURE — 25000003 PHARM REV CODE 250: Performed by: FAMILY MEDICINE

## 2023-11-29 PROCEDURE — 63600175 PHARM REV CODE 636 W HCPCS: Performed by: FAMILY MEDICINE

## 2023-11-29 RX ORDER — MICONAZOLE NITRATE 2 %
POWDER (GRAM) TOPICAL 2 TIMES DAILY
Status: DISCONTINUED | OUTPATIENT
Start: 2023-11-29 | End: 2023-12-11 | Stop reason: HOSPADM

## 2023-11-29 RX ORDER — MORPHINE SULFATE 4 MG/ML
4 INJECTION, SOLUTION INTRAMUSCULAR; INTRAVENOUS ONCE
Status: COMPLETED | OUTPATIENT
Start: 2023-11-29 | End: 2023-11-29

## 2023-11-29 RX ORDER — SILVER SULFADIAZINE 10 G/1000G
CREAM TOPICAL DAILY PRN
Status: DISCONTINUED | OUTPATIENT
Start: 2023-11-29 | End: 2023-12-11 | Stop reason: HOSPADM

## 2023-11-29 RX ADMIN — PIPERACILLIN AND TAZOBACTAM 4.5 G: 4; .5 INJECTION, POWDER, FOR SOLUTION INTRAVENOUS; PARENTERAL at 08:11

## 2023-11-29 RX ADMIN — PANTOPRAZOLE SODIUM 20 MG: 20 TABLET, DELAYED RELEASE ORAL at 10:11

## 2023-11-29 RX ADMIN — DOCUSATE SODIUM 200 MG: 100 CAPSULE, LIQUID FILLED ORAL at 08:11

## 2023-11-29 RX ADMIN — DONEPEZIL HYDROCHLORIDE 10 MG: 5 TABLET, FILM COATED ORAL at 08:11

## 2023-11-29 RX ADMIN — DOCUSATE SODIUM 200 MG: 100 CAPSULE, LIQUID FILLED ORAL at 10:11

## 2023-11-29 RX ADMIN — FERROUS SULFATE TAB 325 MG (65 MG ELEMENTAL FE) 1 EACH: 325 (65 FE) TAB at 08:11

## 2023-11-29 RX ADMIN — MICONAZOLE NITRATE 2 % TOPICAL POWDER: at 12:11

## 2023-11-29 RX ADMIN — MUPIROCIN: 20 OINTMENT TOPICAL at 10:11

## 2023-11-29 RX ADMIN — LISINOPRIL 10 MG: 10 TABLET ORAL at 10:11

## 2023-11-29 RX ADMIN — OXYCODONE HYDROCHLORIDE 5 MG: 5 TABLET ORAL at 08:11

## 2023-11-29 RX ADMIN — MORPHINE SULFATE 4 MG: 4 INJECTION, SOLUTION INTRAMUSCULAR; INTRAVENOUS at 04:11

## 2023-11-29 RX ADMIN — ASPIRIN 81 MG: 81 TABLET, COATED ORAL at 08:11

## 2023-11-29 RX ADMIN — POLYETHYLENE GLYCOL 3350 17 G: 17 POWDER, FOR SOLUTION ORAL at 10:11

## 2023-11-29 RX ADMIN — MIRTAZAPINE 7.5 MG: 7.5 TABLET, FILM COATED ORAL at 08:11

## 2023-11-29 RX ADMIN — TICAGRELOR 90 MG: 90 TABLET ORAL at 08:11

## 2023-11-29 RX ADMIN — ATORVASTATIN CALCIUM 40 MG: 40 TABLET, FILM COATED ORAL at 08:11

## 2023-11-29 RX ADMIN — OXYCODONE HYDROCHLORIDE 5 MG: 5 TABLET ORAL at 12:11

## 2023-11-29 RX ADMIN — FERROUS SULFATE TAB 325 MG (65 MG ELEMENTAL FE) 1 EACH: 325 (65 FE) TAB at 10:11

## 2023-11-29 RX ADMIN — MICONAZOLE NITRATE 2 % TOPICAL POWDER: at 08:11

## 2023-11-29 RX ADMIN — CYANOCOBALAMIN TAB 500 MCG 2000 MCG: 500 TAB at 10:11

## 2023-11-29 RX ADMIN — CARVEDILOL 6.25 MG: 6.25 TABLET, FILM COATED ORAL at 10:11

## 2023-11-29 RX ADMIN — MUPIROCIN: 20 OINTMENT TOPICAL at 08:11

## 2023-11-29 RX ADMIN — MEMANTINE 10 MG: 5 TABLET ORAL at 10:11

## 2023-11-29 RX ADMIN — ACETAMINOPHEN 1000 MG: 500 TABLET, FILM COATED ORAL at 10:11

## 2023-11-29 RX ADMIN — MEMANTINE 10 MG: 5 TABLET ORAL at 08:11

## 2023-11-29 RX ADMIN — PIPERACILLIN AND TAZOBACTAM 4.5 G: 4; .5 INJECTION, POWDER, FOR SOLUTION INTRAVENOUS; PARENTERAL at 12:11

## 2023-11-29 RX ADMIN — TICAGRELOR 90 MG: 90 TABLET ORAL at 10:11

## 2023-11-29 RX ADMIN — FAMOTIDINE 20 MG: 20 TABLET, FILM COATED ORAL at 10:11

## 2023-11-29 RX ADMIN — PIPERACILLIN AND TAZOBACTAM 4.5 G: 4; .5 INJECTION, POWDER, FOR SOLUTION INTRAVENOUS; PARENTERAL at 04:11

## 2023-11-29 NOTE — SUBJECTIVE & OBJECTIVE
Subjective:     HPI:  84 yo male with surgical wound to right great toe amputation site, left great toe and heel, and incontinence dermatitis to sacral/perineum. He is status post debridement of right great toe per Dr. Butcher 11/28. Retention sutures intact. Granulation tissue to wound bed. Right great toe with slough and eschar. Significant PMH includes  hypertension, PVD, CKD, and hypertension. Wound healing is complicated by multiple co-morbidities, poor vascular supply, edema, necrosis, tract(s), advanced age, fragile skin, no protective sensation, and infection.          Hospital Course:   11/29/2023 - Wound care consulted to evaluate and follow wounds . POC established today. Barriers to wound healing identified and preventive measures in place            Scheduled Meds:   acetaminophen  1,000 mg Oral TID    aspirin  81 mg Oral QHS    atorvastatin  40 mg Oral QHS    carvediloL  6.25 mg Oral Daily    cyanocobalamin  2,000 mcg Oral Daily    docusate sodium  200 mg Oral BID    donepeziL  10 mg Oral QHS    famotidine  20 mg Oral Daily    ferrous sulfate  1 tablet Oral BID    lisinopriL  10 mg Oral Daily    memantine  10 mg Oral BID    mirtazapine  7.5 mg Oral Nightly    mupirocin   Nasal BID    pantoprazole  20 mg Oral Daily    piperacillin-tazobactam (Zosyn) IV (PEDS and ADULTS) (extended infusion is not appropriate)  4.5 g Intravenous Q8H    polyethylene glycol  17 g Oral Daily    ticagrelor  90 mg Oral BID    vancomycin (VANCOCIN) IV (PEDS and ADULTS)  20 mg/kg Intravenous Q48H     Continuous Infusions:  PRN Meds:acetaminophen, ondansetron, oxyCODONE, vancomycin - pharmacy to dose    Review of Systems   Unable to perform ROS: Dementia     Objective:     Vital Signs (Most Recent):  Temp: 97 °F (36.1 °C) (11/29/23 0746)  Pulse: 79 (11/29/23 0746)  Resp: 18 (11/29/23 0746)  BP: (!) 128/50 (11/29/23 0746)  SpO2: 98 % (11/29/23 0746) Vital Signs (24h Range):  Temp:  [97 °F (36.1 °C)-97.6 °F (36.4 °C)] 97 °F (36.1  °C)  Pulse:  [53-84] 79  Resp:  [11-20] 18  SpO2:  [94 %-100 %] 98 %  BP: ()/(44-74) 128/50     Weight: 79.7 kg (175 lb 9.6 oz)  Body mass index is 23.17 kg/m².     Physical Exam  Vitals reviewed.   Constitutional:       Appearance: He is ill-appearing.   HENT:      Head: Normocephalic.   Cardiovascular:      Rate and Rhythm: Normal rate and regular rhythm.      Pulses: Normal pulses.      Heart sounds: Normal heart sounds.   Pulmonary:      Effort: Pulmonary effort is normal.      Breath sounds: Normal breath sounds.   Musculoskeletal:         General: Tenderness and deformity present.      Right lower leg: Edema present.      Left lower leg: Edema present.   Skin:     Capillary Refill: Capillary refill takes more than 3 seconds.      Findings: Erythema and rash present.      Comments: Wound, see wound assessment and pictures   Neurological:      Mental Status: He is alert. Mental status is at baseline.      Motor: Weakness present.      Gait: Gait abnormal.

## 2023-11-29 NOTE — ASSESSMENT & PLAN NOTE
Clean with vashe  Pat dry  Apply silvadene to left great toe  Cover and secure with 4x4s, josie, and paper tape  Change daily and PRN  Elevate legs when sitting or in bed  Waffle boots when in bed

## 2023-11-29 NOTE — PT/OT/SLP EVAL
Speech Language Pathology Evaluation  Bedside Swallow    Patient Name:  Riley Fox   MRN:  [id number]  Admitting Diagnosis: Abscess, toe, left    Recommendations:                 General Recommendations:  Follow-up not indicated  Diet recommendations:  Mechanical soft, Pureed meat, Thin   Aspiration Precautions: 1 bite/sip at a time, Alternating bites/sips, HOB to 90 degrees, Remain upright 30 minutes post meal, Small bites/sips, and Standard aspiration precautions   General Precautions: Standard,    Communication strategies:   Hard of hearing    Assessment:     Riley Fox is a 83 y.o. male with an SLP diagnosis of  possible dysphagia .  He presents with passing BEDSIDE SWALLOW EVALUATION with pudding and thin liquids; however, patient is edentulous and reports his son cannot find his dentures.    History:     History reviewed. No pertinent past medical history.    Past Surgical History:   Procedure Laterality Date    TOE AMPUTATION Left 11/28/2023    Procedure: AMPUTATION, TOE;  Surgeon: Riana Butcher MD;  Location: [facility];  Service: General;  Laterality: Left;       Social History: Patient lives in a nursing home.    Prior Intubation HX:  n/a    Modified Barium Swallow: n/a    Chest X-Rays: see chart    Prior diet: unknown.    Occupation/hobbies/homemaking: not stated.    Subjective     Patient lying in bed awake and alert. Patient able to answer simple questions. Patient agreeable to BEDSIDE SWALLOW EVALUATION.  Patient goals: not stated     Pain/Comfort:       Respiratory Status: Room air    Objective:     Oral Musculature Evaluation  Dentition: edentulous, other (see comments) (Pt reports that he has dentures but his son cannot find them.)  Secretion Management: adequate  Mucosal Quality: adequate  Oral Labial Strength and Mobility: WFL  Lingual Strength and Mobility: WF    Bedside Swallow Eval:   Consistencies Assessed:  Thin liquids No difficulties noted with small sips of water via  a straw.  Puree No difficulties noted with small bites of pudding. Patient is edentulous but does not have dentures with him and cannot find them. Will recommend pureed meats.       Oral Phase:   WFL    Pharyngeal Phase:   no overt clinical signs/symptoms of aspiration  no overt clinical signs/symptoms of pharyngeal dysphagia    Compensatory Strategies  None    Treatment: Rec a mechanical soft consistency diet with pureed meats and thin liquids as tolerated.    Goals:   Multidisciplinary Problems       SLP Goals       Not on file                    Plan:     Patient to be seen:      Plan of Care expires:     Plan of Care reviewed with:      SLP Follow-Up:  No       Discharge recommendations:  No Therapy Indicated   Barriers to Discharge:  Level of Skilled Assistance Needed nursing home    Time Tracking:     SLP Treatment Date:      Speech Start Time:  0925  Speech Stop Time:  0950     Speech Total Time (min):  25 min    Billable Minutes: Eval Swallow and Oral Function 25 11/29/2023

## 2023-11-29 NOTE — PT/OT/SLP PROGRESS
Occupational Therapy Screen      Patient Name:  Riley Fox   MRN:  [id number]    Patient OT screen performed instead of formal evaluation .Pt is an NH resident and son reported staff assisted pt with all self care needs and pt is transferred to w/c via lift since 2020. Pt is at baseline. No skilled OT indicated.    11/29/2023

## 2023-11-29 NOTE — ASSESSMENT & PLAN NOTE
Clean with vashe  Pat dry  Apply optifoam ag   Cover with 4x4s, josie, and paper tape  Ace wrap to prevent break through bleeding  Elevate on pillows when in bed  Waffle boots

## 2023-11-29 NOTE — PT/OT/SLP PROGRESS
Physical Therapy      Patient Name:  Riley Fox   MRN:  [id number]    Patient not seen today secondary to Therapist assessment Patient seen for a screen only. Patient performs rolling with minimum assist. At baseline patient is latisha lifted to a wheelchair and dependent for w/c mobility through his facility. Patient to return to the nursing home once medically stable. Recommend using bed to chair position for sitting through the day and latisha lift to a recliner chair of OOB desired. Patient with no skilled PT need.

## 2023-11-29 NOTE — NURSING
Wound care note: 84yo male admitted to [facility] with diagnosis of abscess to left toe.  Assessed by A NICOLE Mcgraw. Optifoam applied to amputated site, sutures intact, small oozing of red blood noted, pressure dressing applied with ace bandage.  Arterial ulcer noted to right great toe, slough noted, ssd daily.

## 2023-11-29 NOTE — PROGRESS NOTES
[facility]  Wound Care and Hyperbarics REGINO  Progress Note    Patient Name: Riley Fox  MRN: [id number]  Admission Date: 11/28/2023  Hospital Length of Stay: 1 days  Attending Physician: Paco Lopes Jr., MD  Primary Care Provider: Paulette Primary Doctor         Subjective:     HPI:  82 yo male with surgical wound to left great toe amputation site, right great toe and heel, and incontinence dermatitis to sacral/perineum. He is status post debridement of right great toe per Dr. Butcher 11/28. Retention sutures intact. Granulation tissue to wound bed. Right great toe with slough and eschar. Significant PMH includes  hypertension, PVD, CKD, and hypertension. Wound healing is complicated by multiple co-morbidities, poor vascular supply, edema, necrosis, tract(s), advanced age, fragile skin, no protective sensation, and infection.          Hospital Course:   11/29/2023 - Wound care consulted to evaluate and follow wounds . POC established today. Barriers to wound healing identified and preventive measures in place            Scheduled Meds:   acetaminophen  1,000 mg Oral TID    aspirin  81 mg Oral QHS    atorvastatin  40 mg Oral QHS    carvediloL  6.25 mg Oral Daily    cyanocobalamin  2,000 mcg Oral Daily    docusate sodium  200 mg Oral BID    donepeziL  10 mg Oral QHS    famotidine  20 mg Oral Daily    ferrous sulfate  1 tablet Oral BID    lisinopriL  10 mg Oral Daily    memantine  10 mg Oral BID    mirtazapine  7.5 mg Oral Nightly    mupirocin   Nasal BID    pantoprazole  20 mg Oral Daily    piperacillin-tazobactam (Zosyn) IV (PEDS and ADULTS) (extended infusion is not appropriate)  4.5 g Intravenous Q8H    polyethylene glycol  17 g Oral Daily    ticagrelor  90 mg Oral BID    vancomycin (VANCOCIN) IV (PEDS and ADULTS)  20 mg/kg Intravenous Q48H     Continuous Infusions:  PRN Meds:acetaminophen, ondansetron, oxyCODONE, vancomycin - pharmacy to dose    Review of Systems   Unable to  perform ROS: Dementia     Objective:     Vital Signs (Most Recent):  Temp: 97 °F (36.1 °C) (11/29/23 0746)  Pulse: 79 (11/29/23 0746)  Resp: 18 (11/29/23 0746)  BP: (!) 128/50 (11/29/23 0746)  SpO2: 98 % (11/29/23 0746) Vital Signs (24h Range):  Temp:  [97 °F (36.1 °C)-97.6 °F (36.4 °C)] 97 °F (36.1 °C)  Pulse:  [53-84] 79  Resp:  [11-20] 18  SpO2:  [94 %-100 %] 98 %  BP: ()/(44-74) 128/50     Weight: 79.7 kg (175 lb 9.6 oz)  Body mass index is 23.17 kg/m².     Physical Exam  Vitals reviewed.   Constitutional:       Appearance: He is ill-appearing.   HENT:      Head: Normocephalic.   Cardiovascular:      Rate and Rhythm: Normal rate and regular rhythm.      Pulses: Normal pulses.      Heart sounds: Normal heart sounds.   Pulmonary:      Effort: Pulmonary effort is normal.      Breath sounds: Normal breath sounds.   Musculoskeletal:         General: Tenderness and deformity present.      Right lower leg: Edema present.      Left lower leg: Edema present.   Skin:     Capillary Refill: Capillary refill takes more than 3 seconds.      Findings: Erythema and rash present.      Comments: Wound, see wound assessment and pictures   Neurological:      Mental Status: He is alert. Mental status is at baseline.      Motor: Weakness present.      Gait: Gait abnormal.              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

## 2023-11-29 NOTE — PLAN OF CARE
"[facility]  Initial Discharge Assessment       Primary Care Provider: Paulette, Primary Doctor    Admission Diagnosis: Wound infection [T14.8XXA, L08.9]    Admission Date: 11/28/2023  Expected Discharge Date:     Transition of Care Barriers: None    Payor: MEDICARE / Plan: MEDICARE PART A & B / Product Type: Government /     Extended Emergency Contact Information  Primary Emergency Contact: malloryaamirmike lopez  Mobile Phone: [phone]  Relation: Son            No Pharmacies Listed    Initial Assessment (most recent)       Adult Discharge Assessment - 11/29/23 1521          Discharge Assessment    Assessment Type Discharge Planning Assessment     Confirmed Demographics Correct on Facesheet     Source of Information family     If unable to respond/provide information was family/caregiver contacted? Yes     Contact Name/Number Riley Fox (Scott) (son) [phone]     Communicated JORI with patient/caregiver Date not available/Unable to determine     Reason For Admission abscess, toe, left     People in Home facility resident     Facility Arrived From: [facility]     Do you expect to return to your current living situation? Yes     Do you have help at home or someone to help you manage your care at home? Yes     Who are your caregiver(s) and their phone number(s)? facility staff/Riley Fox (Scott) (son) [phone]     Prior to hospitilization cognitive status: Unable to Assess     Current cognitive status: Alert/Oriented     Home Accessibility wheelchair accessible     Home Layout Able to live on 1st floor     Equipment Currently Used at Home wheelchair     Readmission within 30 days? No     Patient currently being followed by outpatient case management? No     Do you currently have service(s) that help you manage your care at home? Yes   facility resident    Name and Contact number of agency [facility]     Is the pt/caregiver preference to resume services with " "current agency Yes     Do you take prescription medications? Yes     Do you have prescription coverage? Yes     Do you have any problems affording any of your prescribed medications? No     Is the patient taking medications as prescribed? yes     Who is going to help you get home at discharge? Riley Fox (Scott) (son) [phone]     How do you get to doctors appointments? agency;family or friend will provide     Are you on dialysis? No     Do you take coumadin? No     Discharge Plan discussed with: POA;Adult children;Patient     Name(s) and Number(s) Riley Fox (Scott) (son) [phone]     Transition of Care Barriers None                   Sw spoke with pt and Riley Fox (Scott) (son) [phone] at bedside to complete dcp initial assessment. Pta, pt was resident at [facility]. Dcp is to return there. Ss following for dc needs and recommendations.             "

## 2023-11-29 NOTE — OP NOTE
[facility]  Surgery Department  Operative Note    SUMMARY     Date of Procedure: 11/28/2023     Procedure: Procedure(s) (LRB):  AMPUTATION, TOE (Left)     Surgeon(s) and Role:     * Riana Butcher MD - Primary    Assisting Surgeon: None    Pre-Operative Diagnosis: Abscess, toe, left [L02.612]    Post-Operative Diagnosis: Post-Op Diagnosis Codes:     * Abscess, toe, left [L02.612]    Anesthesia: General    Operative Findings (including complications, if any):  Osteomyelitis of the great toe    Description of Technical Procedures:  Taken operative suite left foot prepped draped.  Infiltrated local the digital block.  Next fishmouth incision disarticulated metatarsophalangeal joint rongeured portion of the metatarsal head copiously irrigated had healthy-appearing tissue approximated skin edges with interrupted nylons sterile dressing applied    Significant Surgical Tasks Conducted by the Assistant(s), if Applicable:  None    Estimated Blood Loss (EBL): * No values recorded between 11/28/2023  3:14 PM and 11/28/2023  3:27 PM *10cc           Implants: * No implants in log *    Specimens:   Specimen (24h ago, onward)      None                    Condition: Good    Disposition: PACU - hemodynamically stable.    Attestation: I was present and scrubbed for the entire procedure.

## 2023-11-29 NOTE — ASSESSMENT & PLAN NOTE
Clean with soap and water  Pat dry   Apply miconazole powder twice daily  Do not apply foam border - moisture will allow yeast to multiple

## 2023-11-29 NOTE — HOSPITAL COURSE
11/29/2023 - Wound care consulted to evaluate and follow wounds . POC established today. Barriers to wound healing identified and preventive measures in place  12/6/2023 - Right great toe has improved. Continue with silvadene. Incision site is healing well. Continue optifoam ag. He has SDTI to left lateral foot, have been using foam borders with no improvement. D/c foam borders and pain with betadine and apply dry dressing.   12/11/2023 - Right toe has healed. Incision site continues to improve. Sutures removed today. Continue to protect with aquacel ag foam border

## 2023-11-30 LAB
ALBUMIN SERPL BCP-MCNC: 2.5 G/DL (ref 3.5–5)
ALBUMIN/GLOB SERPL: 0.7 {RATIO}
ALP SERPL-CCNC: 74 U/L (ref 45–115)
ALT SERPL W P-5'-P-CCNC: 8 U/L (ref 16–61)
ANION GAP SERPL CALCULATED.3IONS-SCNC: 9 MMOL/L (ref 7–16)
AST SERPL W P-5'-P-CCNC: 11 U/L (ref 15–37)
BILIRUB SERPL-MCNC: 0.3 MG/DL (ref ?–1.2)
BUN SERPL-MCNC: 23 MG/DL (ref 7–18)
BUN/CREAT SERPL: 15 (ref 6–20)
CALCIUM SERPL-MCNC: 8.2 MG/DL (ref 8.5–10.1)
CHLORIDE SERPL-SCNC: 111 MMOL/L (ref 98–107)
CO2 SERPL-SCNC: 24 MMOL/L (ref 21–32)
CREAT SERPL-MCNC: 1.55 MG/DL (ref 0.7–1.3)
EGFR (NO RACE VARIABLE) (RUSH/TITUS): 45 ML/MIN/1.73M2
GLOBULIN SER-MCNC: 3.7 G/DL (ref 2–4)
GLUCOSE SERPL-MCNC: 80 MG/DL (ref 74–106)
MAGNESIUM SERPL-MCNC: 1.9 MG/DL (ref 1.7–2.3)
MICROORGANISM SPEC CULT: ABNORMAL
PHOSPHATE SERPL-MCNC: 3.2 MG/DL (ref 2.5–4.5)
POTASSIUM SERPL-SCNC: 4.2 MMOL/L (ref 3.5–5.1)
PROT SERPL-MCNC: 6.2 G/DL (ref 6.4–8.2)
SODIUM SERPL-SCNC: 140 MMOL/L (ref 136–145)

## 2023-11-30 PROCEDURE — 84100 ASSAY OF PHOSPHORUS: CPT | Performed by: INTERNAL MEDICINE

## 2023-11-30 PROCEDURE — 83735 ASSAY OF MAGNESIUM: CPT | Performed by: INTERNAL MEDICINE

## 2023-11-30 PROCEDURE — 63600175 PHARM REV CODE 636 W HCPCS: Performed by: INTERNAL MEDICINE

## 2023-11-30 PROCEDURE — 99232 SBSQ HOSP IP/OBS MODERATE 35: CPT | Mod: ,,, | Performed by: FAMILY MEDICINE

## 2023-11-30 PROCEDURE — 11000001 HC ACUTE MED/SURG PRIVATE ROOM

## 2023-11-30 PROCEDURE — 63600175 PHARM REV CODE 636 W HCPCS: Performed by: FAMILY MEDICINE

## 2023-11-30 PROCEDURE — 25000003 PHARM REV CODE 250: Performed by: HOSPITALIST

## 2023-11-30 PROCEDURE — 25000003 PHARM REV CODE 250: Performed by: INTERNAL MEDICINE

## 2023-11-30 PROCEDURE — 80053 COMPREHEN METABOLIC PANEL: CPT | Performed by: INTERNAL MEDICINE

## 2023-11-30 PROCEDURE — 25000003 PHARM REV CODE 250: Performed by: NURSE PRACTITIONER

## 2023-11-30 PROCEDURE — 25000003 PHARM REV CODE 250: Performed by: FAMILY MEDICINE

## 2023-11-30 PROCEDURE — 99232 PR SUBSEQUENT HOSPITAL CARE,LEVL II: ICD-10-PCS | Mod: ,,, | Performed by: FAMILY MEDICINE

## 2023-11-30 RX ORDER — OXYCODONE HYDROCHLORIDE 5 MG/1
10 TABLET ORAL EVERY 4 HOURS PRN
Status: DISCONTINUED | OUTPATIENT
Start: 2023-11-30 | End: 2023-12-03

## 2023-11-30 RX ADMIN — MEMANTINE 10 MG: 5 TABLET ORAL at 08:11

## 2023-11-30 RX ADMIN — SILVER SULFADIAZINE: 10 CREAM TOPICAL at 09:11

## 2023-11-30 RX ADMIN — PANTOPRAZOLE SODIUM 20 MG: 20 TABLET, DELAYED RELEASE ORAL at 08:11

## 2023-11-30 RX ADMIN — FAMOTIDINE 20 MG: 20 TABLET, FILM COATED ORAL at 08:11

## 2023-11-30 RX ADMIN — DOCUSATE SODIUM 200 MG: 100 CAPSULE, LIQUID FILLED ORAL at 08:11

## 2023-11-30 RX ADMIN — TICAGRELOR 90 MG: 90 TABLET ORAL at 08:11

## 2023-11-30 RX ADMIN — VANCOMYCIN HYDROCHLORIDE 1500 MG: 1 INJECTION, POWDER, LYOPHILIZED, FOR SOLUTION INTRAVENOUS at 09:11

## 2023-11-30 RX ADMIN — POLYETHYLENE GLYCOL 3350 17 G: 17 POWDER, FOR SOLUTION ORAL at 08:11

## 2023-11-30 RX ADMIN — PIPERACILLIN AND TAZOBACTAM 4.5 G: 4; .5 INJECTION, POWDER, FOR SOLUTION INTRAVENOUS; PARENTERAL at 12:11

## 2023-11-30 RX ADMIN — PIPERACILLIN AND TAZOBACTAM 4.5 G: 4; .5 INJECTION, POWDER, FOR SOLUTION INTRAVENOUS; PARENTERAL at 04:11

## 2023-11-30 RX ADMIN — CYANOCOBALAMIN TAB 500 MCG 2000 MCG: 500 TAB at 08:11

## 2023-11-30 RX ADMIN — CARVEDILOL 6.25 MG: 6.25 TABLET, FILM COATED ORAL at 08:11

## 2023-11-30 RX ADMIN — MICONAZOLE NITRATE 2 % TOPICAL POWDER: at 08:11

## 2023-11-30 RX ADMIN — ACETAMINOPHEN 1000 MG: 500 TABLET, FILM COATED ORAL at 08:11

## 2023-11-30 RX ADMIN — FERROUS SULFATE TAB 325 MG (65 MG ELEMENTAL FE) 1 EACH: 325 (65 FE) TAB at 08:11

## 2023-11-30 RX ADMIN — ATORVASTATIN CALCIUM 40 MG: 40 TABLET, FILM COATED ORAL at 08:11

## 2023-11-30 RX ADMIN — MIRTAZAPINE 7.5 MG: 7.5 TABLET, FILM COATED ORAL at 08:11

## 2023-11-30 RX ADMIN — PIPERACILLIN AND TAZOBACTAM 4.5 G: 4; .5 INJECTION, POWDER, FOR SOLUTION INTRAVENOUS; PARENTERAL at 08:11

## 2023-11-30 RX ADMIN — LISINOPRIL 10 MG: 10 TABLET ORAL at 08:11

## 2023-11-30 RX ADMIN — ASPIRIN 81 MG: 81 TABLET, COATED ORAL at 08:11

## 2023-11-30 RX ADMIN — MUPIROCIN: 20 OINTMENT TOPICAL at 08:11

## 2023-11-30 RX ADMIN — DONEPEZIL HYDROCHLORIDE 10 MG: 5 TABLET, FILM COATED ORAL at 08:11

## 2023-11-30 RX ADMIN — ACETAMINOPHEN 1000 MG: 500 TABLET, FILM COATED ORAL at 03:11

## 2023-11-30 NOTE — ASSESSMENT & PLAN NOTE
Creatine stable for now. BMP reviewed- noted Estimated Creatinine Clearance: 42.1 mL/min (A) (based on SCr of 1.55 mg/dL (H)). according to latest data. Based on current GFR, CKD stage is stage III.   Monitor UOP and serial BMP and adjust therapy as needed. Renally dose meds. Avoid nephrotoxic medications and procedures.

## 2023-11-30 NOTE — ASSESSMENT & PLAN NOTE
Pt seen in ER for surgical eval, gen surg recommend iv abx and OR  Pt to be admitted to [facility] post intervention  Cont iv abs  Wound care  Follow cx    11/29 - Now s/p amputation.  Continue abx.

## 2023-11-30 NOTE — PLAN OF CARE
"[facility]   Interdisciplinary Team Meeting    Patient: Riley Fox   Today's Date: 11/30/2023   Estimated D/C Date:         Physician:  Paco Lopes MD  Unit Director:  n/a    Pharmacy: Darya SantiagoD Case Management:  Feli Barrios RN/ SS    Physical/Occupational Therapy: Thea Milton OT Speech Therapy: Thea Milton OT   Respiratory: Rusty Smith, RT Nursing: Filomena Donaldson RN   Wound Care: Erika Choudhary LPN   Utilization Management: Ángel Mcdowell RN     Nursing  New Symptoms/Problems: n/a  Bowel: incontinent Urine: incontinent [place]: No   Constipated: No    Lab Concerns: BUN 23, Calcium 8.2, Albumin 2.5  Lab Reviewed: Yes  New Lab Orders: No    Nutrition:  Mechanical Soft with Pureed Meats   Intake percentage: %   Weight: Weight: 79.7 kg (175 lb 9.6 oz)   Height: Height: 6' 1" (185.4 cm)  BMI: BMI (Calculated): 23.2    Speech/Swallowing: No current speech or swallowing issues  Aspiration Precautions: Yes  Cognition: WNL Diarrhea: No      Respiratory Therapy  Isolation: No  Last Bowel Movement: 11/29/23     O2 Device: Nasal Cannula  Vent: No  Comment(s): n/a   O2 Flow: 2L  SpO2: 98 %       Physical Therapy  Physical Therapy/Gait: n/a  ELOS: Plan to DC     Transfers:  no eval done   Bed Mobility:  no eval  Range of Motion/Restrictions:   Comment(s): No PT/OT eval at this time      Occupational Therapy  Eating/Grooming: Activity did not occur Toileting: Activity did not occur   Bathing: Activity did not occur Dressing (Upper Body): Activity did not occur   Dressing (Lower Body): Activity did not occur      Wound Care: Amputated Left Great tone with Optifoan M,W,F and Right Great Toe with Silvadene Daily       Tx Plan/Recommendations reviewed with family and/or patient on (date) 11/28/2023.  Additional family Conference/Training: Pt will return to [facility]   D/C Plan/Recommendations: Discharge to SNF  JORI: n/a "     Pharmacy  Medication Changes (see MD orders in chart): No  Pharmacy comments: awaiting Cultures for possible d/c of IV Zosyn  IV Medications: Vancomycin q 48 and Zosyn q8.

## 2023-11-30 NOTE — PLAN OF CARE
Problem: Skin Injury Risk Increased  Goal: Skin Health and Integrity  Outcome: Ongoing, Progressing     Problem: Skin Injury Risk Increased  Goal: Skin Health and Integrity  Outcome: Ongoing, Progressing     Problem: Impaired Wound Healing  Goal: Optimal Wound Healing  Outcome: Ongoing, Progressing     Problem: Impaired Wound Healing  Goal: Optimal Wound Healing  Outcome: Ongoing, Progressing

## 2023-11-30 NOTE — SUBJECTIVE & OBJECTIVE
Interval History: Complains of Pain at amputation site.     Review of Systems  Objective:     Vital Signs (Most Recent):  Temp: 97.6 °F (36.4 °C) (11/30/23 0400)  Pulse: 78 (11/30/23 0400)  Resp: 14 (11/30/23 0400)  BP: (!) 111/58 (nurse aware) (11/30/23 0400)  SpO2: 98 % (11/30/23 0400) Vital Signs (24h Range):  Temp:  [97 °F (36.1 °C)-98.2 °F (36.8 °C)] 97.6 °F (36.4 °C)  Pulse:  [60-79] 78  Resp:  [14-20] 14  SpO2:  [90 %-99 %] 98 %  BP: (111-131)/(50-61) 111/58     Weight: 79.7 kg (175 lb 9.6 oz)  Body mass index is 23.17 kg/m².    Intake/Output Summary (Last 24 hours) at 11/30/2023 0658  Last data filed at 11/30/2023 0051  Gross per 24 hour   Intake 1818.33 ml   Output --   Net 1818.33 ml         Physical Exam  Vitals reviewed.   Constitutional:       General: He is not in acute distress.     Appearance: He is not toxic-appearing.   Cardiovascular:      Rate and Rhythm: Normal rate and regular rhythm.      Heart sounds: Normal heart sounds.   Pulmonary:      Effort: Pulmonary effort is normal. No respiratory distress.      Breath sounds: Normal breath sounds. No wheezing.   Abdominal:      General: Abdomen is flat. Bowel sounds are normal. There is no distension.      Palpations: Abdomen is soft.   Neurological:      Mental Status: He is alert.             Significant Labs: All pertinent labs within the past 24 hours have been reviewed.    Significant Imaging: I have reviewed all pertinent imaging results/findings within the past 24 hours.

## 2023-11-30 NOTE — ANESTHESIA POSTPROCEDURE EVALUATION
Anesthesia Post Evaluation    Patient: Riley Fox    Procedure(s) Performed: Procedure(s) (LRB):  AMPUTATION, TOE (Left)    Final Anesthesia Type: general      Patient location during evaluation: PACU  Patient participation: Yes- Able to Participate  Level of consciousness: awake and alert  Post-procedure vital signs: reviewed and stable  Pain management: adequate  Airway patency: patent  GLORIA mitigation strategies: Multimodal analgesia  PONV status at discharge: No PONV  Anesthetic complications: no      Cardiovascular status: blood pressure returned to baseline  Respiratory status: unassisted  Hydration status: euvolemic  Follow-up not needed.              Vitals Value Taken Time   /77 11/30/23 0816   Temp 36.4 °C (97.6 °F) 11/30/23 0816   Pulse 85 11/30/23 0816   Resp 16 11/30/23 0816   SpO2 96 % 11/30/23 0816         Event Time   Out of Recovery 11/28/2023 16:15:00         Pain/Mary Score: Pain Rating Prior to Med Admin: 0 (11/30/2023  8:45 AM)  Pain Rating Post Med Admin: 0 (11/30/2023  9:45 AM)

## 2023-11-30 NOTE — PROGRESS NOTES
[facility]  Progress Note    Patient Name: Riley Fox  MRN: [id number]  Patient Class: IP- Inpatient   Admission Date: 11/28/2023  Length of Stay: 2 days  Attending Physician: Paco Lopes Jr., MD  Primary Care Provider: Paulette, Primary Doctor        Subjective:     Principal Problem:Abscess, toe, left        HPI:  Patient is a 83-year-old male with past medical history of hypertension, spinal stenosis, peripheral arterial disease, questionable dementia, chronic kidney disease, who presents to the emergency room for evaluation of left great toe osteomyelitis along with cellulitis of left lower extremity.  Patient is a long-term resident at the nursing home secondary to his spinal stenosis and resulting immobility, it appears as though he has some contractures of his lower extremity, his son is at bedside, he tells me that the patient has had some memory issues lately, he also tells me that he has history of CVA in the past.  Patient endorses pain in bilateral lower extremities, mostly on the affected side that is the left great toe, although the right lower extremity also appears to have some wounds.  General surgery evaluated the patient in the ER and recommended the patient to take to the OR for debridement and possible amputation of the affected toe, followed by admission to [facility] for long-term wound care and IV antibiotics.  Patient otherwise is hemodynamically stable, denies any chest pain, denies any shortness of breath, denies any fevers chills nausea vomiting or diarrhea.    Overview/Hospital Course:  No notes on file    Interval History: Complains of Pain at amputation site.     Review of Systems  Objective:     Vital Signs (Most Recent):  Temp: 97.6 °F (36.4 °C) (11/30/23 0400)  Pulse: 78 (11/30/23 0400)  Resp: 14 (11/30/23 0400)  BP: (!) 111/58 (nurse aware) (11/30/23 0400)  SpO2: 98 % (11/30/23 0400) Vital  Signs (24h Range):  Temp:  [97 °F (36.1 °C)-98.2 °F (36.8 °C)] 97.6 °F (36.4 °C)  Pulse:  [60-79] 78  Resp:  [14-20] 14  SpO2:  [90 %-99 %] 98 %  BP: (111-131)/(50-61) 111/58     Weight: 79.7 kg (175 lb 9.6 oz)  Body mass index is 23.17 kg/m².    Intake/Output Summary (Last 24 hours) at 11/30/2023 0658  Last data filed at 11/30/2023 0051  Gross per 24 hour   Intake 1818.33 ml   Output --   Net 1818.33 ml         Physical Exam  Vitals reviewed.   Constitutional:       General: He is not in acute distress.     Appearance: He is not toxic-appearing.   Cardiovascular:      Rate and Rhythm: Normal rate and regular rhythm.      Heart sounds: Normal heart sounds.   Pulmonary:      Effort: Pulmonary effort is normal. No respiratory distress.      Breath sounds: Normal breath sounds. No wheezing.   Abdominal:      General: Abdomen is flat. Bowel sounds are normal. There is no distension.      Palpations: Abdomen is soft.   Neurological:      Mental Status: He is alert.             Significant Labs: All pertinent labs within the past 24 hours have been reviewed.    Significant Imaging: I have reviewed all pertinent imaging results/findings within the past 24 hours.    Assessment/Plan:      * Abscess, toe, left  Pt seen in ER for surgical eval, gen surg recommend iv abx and OR  Pt to be admitted to [facility] post intervention  Cont iv abs  Wound care  Follow cx    11/29 - Now s/p amputation.  Continue abx.     Cellulitis  Cont iv abx      PVD (peripheral vascular disease)  Cont DAPT  Statin      Tremor  Requip home dose      Dementia  Cont medications being taken at NH    HTN (hypertension)  Cont home meds  Chronic controlled      VTE Risk Mitigation (From admission, onward)      None            Discharge Planning   JORI:      Code Status: Full Code   Is the patient medically ready for discharge?:     Reason for patient still in hospital (select all that apply): Treatment  Discharge Plan A: Return to nursing home                   Paco Lopes Jr, MD  Department of Hospital Medicine   [facility]

## 2023-12-01 LAB
ALBUMIN SERPL BCP-MCNC: 2.5 G/DL (ref 3.5–5)
ALBUMIN/GLOB SERPL: 0.6 {RATIO}
ALP SERPL-CCNC: 74 U/L (ref 45–115)
ALT SERPL W P-5'-P-CCNC: 7 U/L (ref 16–61)
ANION GAP SERPL CALCULATED.3IONS-SCNC: 9 MMOL/L (ref 7–16)
AST SERPL W P-5'-P-CCNC: 11 U/L (ref 15–37)
BACTERIA SPEC ANAEROBE CULT: NORMAL
BILIRUB SERPL-MCNC: 0.3 MG/DL (ref ?–1.2)
BUN SERPL-MCNC: 23 MG/DL (ref 7–18)
BUN/CREAT SERPL: 15 (ref 6–20)
CALCIUM SERPL-MCNC: 8.4 MG/DL (ref 8.5–10.1)
CHLORIDE SERPL-SCNC: 112 MMOL/L (ref 98–107)
CO2 SERPL-SCNC: 24 MMOL/L (ref 21–32)
CREAT SERPL-MCNC: 1.49 MG/DL (ref 0.7–1.3)
EGFR (NO RACE VARIABLE) (RUSH/TITUS): 47 ML/MIN/1.73M2
GLOBULIN SER-MCNC: 3.9 G/DL (ref 2–4)
GLUCOSE SERPL-MCNC: 86 MG/DL (ref 74–106)
MAGNESIUM SERPL-MCNC: 2.2 MG/DL (ref 1.7–2.3)
PHOSPHATE SERPL-MCNC: 3.5 MG/DL (ref 2.5–4.5)
POTASSIUM SERPL-SCNC: 4.1 MMOL/L (ref 3.5–5.1)
PROT SERPL-MCNC: 6.4 G/DL (ref 6.4–8.2)
SODIUM SERPL-SCNC: 141 MMOL/L (ref 136–145)

## 2023-12-01 PROCEDURE — 63600175 PHARM REV CODE 636 W HCPCS: Performed by: FAMILY MEDICINE

## 2023-12-01 PROCEDURE — 11000001 HC ACUTE MED/SURG PRIVATE ROOM

## 2023-12-01 PROCEDURE — 25000003 PHARM REV CODE 250: Performed by: FAMILY MEDICINE

## 2023-12-01 PROCEDURE — 99900035 HC TECH TIME PER 15 MIN (STAT)

## 2023-12-01 PROCEDURE — 99233 SBSQ HOSP IP/OBS HIGH 50: CPT | Mod: ,,, | Performed by: STUDENT IN AN ORGANIZED HEALTH CARE EDUCATION/TRAINING PROGRAM

## 2023-12-01 PROCEDURE — 80053 COMPREHEN METABOLIC PANEL: CPT | Performed by: INTERNAL MEDICINE

## 2023-12-01 PROCEDURE — 84100 ASSAY OF PHOSPHORUS: CPT | Performed by: INTERNAL MEDICINE

## 2023-12-01 PROCEDURE — 25000003 PHARM REV CODE 250: Performed by: INTERNAL MEDICINE

## 2023-12-01 PROCEDURE — 83735 ASSAY OF MAGNESIUM: CPT | Performed by: INTERNAL MEDICINE

## 2023-12-01 PROCEDURE — 94761 N-INVAS EAR/PLS OXIMETRY MLT: CPT

## 2023-12-01 PROCEDURE — 99233 PR SUBSEQUENT HOSPITAL CARE,LEVL III: ICD-10-PCS | Mod: ,,, | Performed by: STUDENT IN AN ORGANIZED HEALTH CARE EDUCATION/TRAINING PROGRAM

## 2023-12-01 PROCEDURE — 25000003 PHARM REV CODE 250: Performed by: HOSPITALIST

## 2023-12-01 RX ADMIN — ACETAMINOPHEN 1000 MG: 500 TABLET, FILM COATED ORAL at 10:12

## 2023-12-01 RX ADMIN — MUPIROCIN: 20 OINTMENT TOPICAL at 09:12

## 2023-12-01 RX ADMIN — OXYCODONE HYDROCHLORIDE 10 MG: 5 TABLET ORAL at 07:12

## 2023-12-01 RX ADMIN — TICAGRELOR 90 MG: 90 TABLET ORAL at 09:12

## 2023-12-01 RX ADMIN — MICONAZOLE NITRATE 2 % TOPICAL POWDER: at 10:12

## 2023-12-01 RX ADMIN — DOCUSATE SODIUM 200 MG: 100 CAPSULE, LIQUID FILLED ORAL at 10:12

## 2023-12-01 RX ADMIN — FERROUS SULFATE TAB 325 MG (65 MG ELEMENTAL FE) 1 EACH: 325 (65 FE) TAB at 09:12

## 2023-12-01 RX ADMIN — CYANOCOBALAMIN TAB 500 MCG 2000 MCG: 500 TAB at 09:12

## 2023-12-01 RX ADMIN — CARVEDILOL 6.25 MG: 6.25 TABLET, FILM COATED ORAL at 09:12

## 2023-12-01 RX ADMIN — DOCUSATE SODIUM 200 MG: 100 CAPSULE, LIQUID FILLED ORAL at 09:12

## 2023-12-01 RX ADMIN — MEMANTINE 10 MG: 5 TABLET ORAL at 09:12

## 2023-12-01 RX ADMIN — ACETAMINOPHEN 1000 MG: 500 TABLET, FILM COATED ORAL at 09:12

## 2023-12-01 RX ADMIN — TICAGRELOR 90 MG: 90 TABLET ORAL at 10:12

## 2023-12-01 RX ADMIN — DONEPEZIL HYDROCHLORIDE 10 MG: 5 TABLET, FILM COATED ORAL at 10:12

## 2023-12-01 RX ADMIN — OXYCODONE HYDROCHLORIDE 10 MG: 5 TABLET ORAL at 12:12

## 2023-12-01 RX ADMIN — MEMANTINE 10 MG: 5 TABLET ORAL at 10:12

## 2023-12-01 RX ADMIN — FERROUS SULFATE TAB 325 MG (65 MG ELEMENTAL FE) 1 EACH: 325 (65 FE) TAB at 10:12

## 2023-12-01 RX ADMIN — PANTOPRAZOLE SODIUM 20 MG: 20 TABLET, DELAYED RELEASE ORAL at 09:12

## 2023-12-01 RX ADMIN — MICONAZOLE NITRATE 2 % TOPICAL POWDER: at 09:12

## 2023-12-01 RX ADMIN — LISINOPRIL 10 MG: 10 TABLET ORAL at 09:12

## 2023-12-01 RX ADMIN — ACETAMINOPHEN 1000 MG: 500 TABLET, FILM COATED ORAL at 04:12

## 2023-12-01 RX ADMIN — MIRTAZAPINE 7.5 MG: 7.5 TABLET, FILM COATED ORAL at 10:12

## 2023-12-01 RX ADMIN — FAMOTIDINE 20 MG: 20 TABLET, FILM COATED ORAL at 09:12

## 2023-12-01 RX ADMIN — POLYETHYLENE GLYCOL 3350 17 G: 17 POWDER, FOR SOLUTION ORAL at 09:12

## 2023-12-01 RX ADMIN — ASPIRIN 81 MG: 81 TABLET, COATED ORAL at 10:12

## 2023-12-01 RX ADMIN — PIPERACILLIN AND TAZOBACTAM 4.5 G: 4; .5 INJECTION, POWDER, FOR SOLUTION INTRAVENOUS; PARENTERAL at 03:12

## 2023-12-01 RX ADMIN — OXYCODONE HYDROCHLORIDE 10 MG: 5 TABLET ORAL at 03:12

## 2023-12-01 RX ADMIN — ATORVASTATIN CALCIUM 40 MG: 40 TABLET, FILM COATED ORAL at 10:12

## 2023-12-01 NOTE — SUBJECTIVE & OBJECTIVE
"Interval History: No complaints. Foot pain better controlled today.  Eating well.      Review of Systems  Objective:     Vital Signs (Most Recent):  Temp: 97.7 °F (36.5 °C) (11/30/23 1555)  Pulse: 62 (11/30/23 1555)  Resp: 16 (11/30/23 1555)  BP: (!) 142/56 (11/30/23 1555)  SpO2: 100 % (11/30/23 1555) Vital Signs (24h Range):  Temp:  [97.6 °F (36.4 °C)-98.2 °F (36.8 °C)] 97.7 °F (36.5 °C)  Pulse:  [60-85] 62  Resp:  [14-18] 16  SpO2:  [94 %-100 %] 100 %  BP: (111-142)/(56-77) 142/56     Weight: 79.7 kg (175 lb 9.6 oz)  Body mass index is 23.17 kg/m².    Intake/Output Summary (Last 24 hours) at 11/30/2023 1835  Last data filed at 11/30/2023 0051  Gross per 24 hour   Intake 100 ml   Output --   Net 100 ml         Physical Exam  Vitals reviewed.   Constitutional:       General: He is not in acute distress.     Appearance: He is not toxic-appearing.   Cardiovascular:      Rate and Rhythm: Normal rate and regular rhythm.      Heart sounds: Normal heart sounds.   Pulmonary:      Effort: Pulmonary effort is normal. No respiratory distress.      Breath sounds: Normal breath sounds. No wheezing.   Abdominal:      General: Abdomen is flat. Bowel sounds are normal. There is no distension.      Palpations: Abdomen is soft.   Neurological:      Mental Status: He is alert.             Significant Labs: All pertinent labs within the past 24 hours have been reviewed.  BMP:   Recent Labs   Lab 11/30/23  0327   GLU 80      K 4.2   *   CO2 24   BUN 23*   CREATININE 1.55*   CALCIUM 8.2*   MG 1.9     CBC: No results for input(s): "WBC", "HGB", "HCT", "PLT" in the last 48 hours.    Significant Imaging: I have reviewed all pertinent imaging results/findings within the past 24 hours.  "

## 2023-12-01 NOTE — SUBJECTIVE & OBJECTIVE
Interval History: naeo    Review of Systems   Constitutional: Negative.  Negative for chills and fever.   HENT: Negative.     Eyes:  Negative for pain and redness.   Respiratory:  Negative for cough, chest tightness, shortness of breath and wheezing.    Cardiovascular:  Negative for chest pain and palpitations.   Gastrointestinal:  Negative for abdominal pain, diarrhea, nausea and vomiting.   Endocrine: Negative for cold intolerance, heat intolerance and polyuria.   Genitourinary:  Negative for difficulty urinating, dysuria, frequency and hematuria.   Musculoskeletal:  Negative for arthralgias, back pain, myalgias, neck pain and neck stiffness.   Skin:  Negative for pallor and rash.   Allergic/Immunologic: Negative.    Neurological:  Negative for dizziness, syncope, weakness, numbness and headaches.   Hematological:  Negative for adenopathy.   Psychiatric/Behavioral:  Negative for agitation, confusion and hallucinations. The patient is not nervous/anxious.      Objective:     Vital Signs (Most Recent):  Temp: 97.5 °F (36.4 °C) (12/01/23 0826)  Pulse: 63 (12/01/23 0826)  Resp: 18 (12/01/23 0826)  BP: (!) 125/51 (12/01/23 0826)  SpO2: 99 % (12/01/23 0826) Vital Signs (24h Range):  Temp:  [97.5 °F (36.4 °C)-98.6 °F (37 °C)] 97.5 °F (36.4 °C)  Pulse:  [62-77] 63  Resp:  [16-18] 18  SpO2:  [95 %-100 %] 99 %  BP: (125-148)/(51-68) 125/51     Weight: 79.7 kg (175 lb 9.6 oz)  Body mass index is 23.17 kg/m².    Intake/Output Summary (Last 24 hours) at 12/1/2023 1108  Last data filed at 12/1/2023 0500  Gross per 24 hour   Intake 800.1 ml   Output 850 ml   Net -49.9 ml         Physical Exam  Vitals reviewed.   Cardiovascular:      Rate and Rhythm: Normal rate and regular rhythm.      Heart sounds: Normal heart sounds.   Pulmonary:      Effort: Pulmonary effort is normal.      Breath sounds: Normal breath sounds.   Abdominal:      General: Abdomen is flat. Bowel sounds are normal.      Palpations: Abdomen is soft.    Musculoskeletal:         General: Signs of injury present.      Comments: Wrapped left foot   Neurological:      Mental Status: He is alert.             Significant Labs: All pertinent labs within the past 24 hours have been reviewed.    Significant Imaging: I have reviewed all pertinent imaging results/findings within the past 24 hours.

## 2023-12-01 NOTE — PLAN OF CARE
Chart reviewed. PT/OT sign off, as pt is at mobility baseline. Cont poc. IV ABX Vancomycin through 12/12. Dcp is for pt to return to [facility]. Ss following.

## 2023-12-01 NOTE — PROGRESS NOTES
[facility] Medicine  Progress Note    Patient Name: Riley Fox  MRN: [id number]  Patient Class: IP- Inpatient   Admission Date: 11/28/2023  Length of Stay: 3 days  Attending Physician: Chuy Washburn DO  Primary Care Provider: Paulette, Primary Doctor        Subjective:     Principal Problem:Abscess, toe, left        HPI:  Patient is a 83-year-old male with past medical history of hypertension, spinal stenosis, peripheral arterial disease, questionable dementia, chronic kidney disease, who presents to the emergency room for evaluation of left great toe osteomyelitis along with cellulitis of left lower extremity.  Patient is a long-term resident at the nursing home secondary to his spinal stenosis and resulting immobility, it appears as though he has some contractures of his lower extremity, his son is at bedside, he tells me that the patient has had some memory issues lately, he also tells me that he has history of CVA in the past.  Patient endorses pain in bilateral lower extremities, mostly on the affected side that is the left great toe, although the right lower extremity also appears to have some wounds.  General surgery evaluated the patient in the ER and recommended the patient to take to the OR for debridement and possible amputation of the affected toe, followed by admission to [facility] nurse for long-term wound care and IV antibiotics.  Patient otherwise is hemodynamically stable, denies any chest pain, denies any shortness of breath, denies any fevers chills nausea vomiting or diarrhea.    Overview/Hospital Course:  12/1- chart reviewed, continue vanco for 10 days    Interval History: naeo    Review of Systems   Constitutional: Negative.  Negative for chills and fever.   HENT: Negative.     Eyes:  Negative for pain and redness.   Respiratory:  Negative for cough, chest tightness, shortness of breath and wheezing.    Cardiovascular:   Negative for chest pain and palpitations.   Gastrointestinal:  Negative for abdominal pain, diarrhea, nausea and vomiting.   Endocrine: Negative for cold intolerance, heat intolerance and polyuria.   Genitourinary:  Negative for difficulty urinating, dysuria, frequency and hematuria.   Musculoskeletal:  Negative for arthralgias, back pain, myalgias, neck pain and neck stiffness.   Skin:  Negative for pallor and rash.   Allergic/Immunologic: Negative.    Neurological:  Negative for dizziness, syncope, weakness, numbness and headaches.   Hematological:  Negative for adenopathy.   Psychiatric/Behavioral:  Negative for agitation, confusion and hallucinations. The patient is not nervous/anxious.      Objective:     Vital Signs (Most Recent):  Temp: 97.5 °F (36.4 °C) (12/01/23 0826)  Pulse: 63 (12/01/23 0826)  Resp: 18 (12/01/23 0826)  BP: (!) 125/51 (12/01/23 0826)  SpO2: 99 % (12/01/23 0826) Vital Signs (24h Range):  Temp:  [97.5 °F (36.4 °C)-98.6 °F (37 °C)] 97.5 °F (36.4 °C)  Pulse:  [62-77] 63  Resp:  [16-18] 18  SpO2:  [95 %-100 %] 99 %  BP: (125-148)/(51-68) 125/51     Weight: 79.7 kg (175 lb 9.6 oz)  Body mass index is 23.17 kg/m².    Intake/Output Summary (Last 24 hours) at 12/1/2023 1108  Last data filed at 12/1/2023 0500  Gross per 24 hour   Intake 800.1 ml   Output 850 ml   Net -49.9 ml         Physical Exam  Vitals reviewed.   Cardiovascular:      Rate and Rhythm: Normal rate and regular rhythm.      Heart sounds: Normal heart sounds.   Pulmonary:      Effort: Pulmonary effort is normal.      Breath sounds: Normal breath sounds.   Abdominal:      General: Abdomen is flat. Bowel sounds are normal.      Palpations: Abdomen is soft.   Musculoskeletal:         General: Signs of injury present.      Comments: Wrapped left foot   Neurological:      Mental Status: He is alert.             Significant Labs: All pertinent labs within the past 24 hours have been reviewed.    Significant Imaging: I have reviewed all  pertinent imaging results/findings within the past 24 hours.    Assessment/Plan:      * Abscess, toe, left  Pt seen in ER for surgical eval, gen surg recommend iv abx and OR  Pt to be admitted to [facility] post intervention  Cont iv abs  Wound care  Follow cx    11/29 - Now s/p amputation.  Continue abx.   11/30 - S/p L great toe amputation.  Continue abx for 1-2 weeks for soft tissue infection of the foot.   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  Stable    HTN (hypertension)  Cont home meds  Chronic controlled    PVD (peripheral vascular disease)  Cont DAPT  Statin  Monitor wound healing.       Cellulitis  Vanco, tentative end date 12/10        VTE Risk Mitigation (From admission, onward)      None            Discharge Planning   JORI:      Code Status: Full Code   Is the patient medically ready for discharge?:     Reason for patient still in hospital (select all that apply): Treatment  Discharge Plan A: Return to nursing home                  Chuy Washburn DO  Department of Hospital Medicine   [facility]

## 2023-12-01 NOTE — ASSESSMENT & PLAN NOTE
Pt seen in ER for surgical eval, gen surg recommend iv abx and OR  Pt to be admitted to [facility] post intervention  Cont iv abs  Wound care  Follow cx    11/29 - Now s/p amputation.  Continue abx.   11/30 - S/p L great toe amputation.  Continue abx for 1-2 weeks for soft tissue infection of the foot.   Continue vanco, tentative end date 12/10

## 2023-12-01 NOTE — ASSESSMENT & PLAN NOTE
Pt seen in ER for surgical eval, gen surg recommend iv abx and OR  Pt to be admitted to [facility] post intervention  Cont iv abs  Wound care  Follow cx    11/29 - Now s/p amputation.  Continue abx.   11/30 - S/p L great toe amputation.  Continue abx for 1-2 weeks for soft tissue infection of the foot.

## 2023-12-01 NOTE — PROGRESS NOTES
[facility] Medicine  Progress Note    Patient Name: Riley Fox  MRN: [id number]  Patient Class: IP- Inpatient   Admission Date: 11/28/2023  Length of Stay: 2 days  Attending Physician: Paco Lopes Jr., MD  Primary Care Provider: Paulette, Primary Doctor        Subjective:     Principal Problem:Abscess, toe, left        HPI:  Patient is a 83-year-old male with past medical history of hypertension, spinal stenosis, peripheral arterial disease, questionable dementia, chronic kidney disease, who presents to the emergency room for evaluation of left great toe osteomyelitis along with cellulitis of left lower extremity.  Patient is a long-term resident at the nursing home secondary to his spinal stenosis and resulting immobility, it appears as though he has some contractures of his lower extremity, his son is at bedside, he tells me that the patient has had some memory issues lately, he also tells me that he has history of CVA in the past.  Patient endorses pain in bilateral lower extremities, mostly on the affected side that is the left great toe, although the right lower extremity also appears to have some wounds.  General surgery evaluated the patient in the ER and recommended the patient to take to the OR for debridement and possible amputation of the affected toe, followed by admission to [facility] nurse for long-term wound care and IV antibiotics.  Patient otherwise is hemodynamically stable, denies any chest pain, denies any shortness of breath, denies any fevers chills nausea vomiting or diarrhea.    Overview/Hospital Course:  No notes on file    Interval History: No complaints. Foot pain better controlled today.  Eating well.      Review of Systems  Objective:     Vital Signs (Most Recent):  Temp: 97.7 °F (36.5 °C) (11/30/23 1555)  Pulse: 62 (11/30/23 1555)  Resp: 16 (11/30/23 1555)  BP: (!) 142/56 (11/30/23 1555)  SpO2: 100 % (11/30/23  "1555) Vital Signs (24h Range):  Temp:  [97.6 °F (36.4 °C)-98.2 °F (36.8 °C)] 97.7 °F (36.5 °C)  Pulse:  [60-85] 62  Resp:  [14-18] 16  SpO2:  [94 %-100 %] 100 %  BP: (111-142)/(56-77) 142/56     Weight: 79.7 kg (175 lb 9.6 oz)  Body mass index is 23.17 kg/m².    Intake/Output Summary (Last 24 hours) at 11/30/2023 1835  Last data filed at 11/30/2023 0051  Gross per 24 hour   Intake 100 ml   Output --   Net 100 ml         Physical Exam  Vitals reviewed.   Constitutional:       General: He is not in acute distress.     Appearance: He is not toxic-appearing.   Cardiovascular:      Rate and Rhythm: Normal rate and regular rhythm.      Heart sounds: Normal heart sounds.   Pulmonary:      Effort: Pulmonary effort is normal. No respiratory distress.      Breath sounds: Normal breath sounds. No wheezing.   Abdominal:      General: Abdomen is flat. Bowel sounds are normal. There is no distension.      Palpations: Abdomen is soft.   Neurological:      Mental Status: He is alert.             Significant Labs: All pertinent labs within the past 24 hours have been reviewed.  BMP:   Recent Labs   Lab 11/30/23  0327   GLU 80      K 4.2   *   CO2 24   BUN 23*   CREATININE 1.55*   CALCIUM 8.2*   MG 1.9     CBC: No results for input(s): "WBC", "HGB", "HCT", "PLT" in the last 48 hours.    Significant Imaging: I have reviewed all pertinent imaging results/findings within the past 24 hours.    Assessment/Plan:      * Abscess, toe, left  Pt seen in ER for surgical eval, gen surg recommend iv abx and OR  Pt to be admitted to [facility] post intervention  Cont iv abs  Wound care  Follow cx    11/29 - Now s/p amputation.  Continue abx.   11/30 - S/p L great toe amputation.  Continue abx for 1-2 weeks for soft tissue infection of the foot.     Cellulitis  Cont iv abx as above.       PVD (peripheral vascular disease)  Cont DAPT  Statin  Monitor wound healing.       Open wound of left great toe    11/30 - Monitor wound healing.  Appreciate " Ms Mcgraw's expertise.     Yeast dermatitis  11/30   Miconazole    Non-pressure chronic ulcer of other part of right foot with fat layer exposed        Tremor  Requip home dose      Dementia  Cont medications being taken at NH.  Stable    HTN (hypertension)  Cont home meds  Chronic controlled      VTE Risk Mitigation (From admission, onward)      None            Discharge Planning   JORI:      Code Status: Full Code   Is the patient medically ready for discharge?:     Reason for patient still in hospital (select all that apply): Treatment  Discharge Plan A: Return to nursing home                  Paco Lopes Jr, MD  Department of Hospital Medicine   [facility]

## 2023-12-01 NOTE — HOSPITAL COURSE
12/1- chart reviewed, continue vanco for 10 days  12/2-no complaints, doing well. Continue abx. Will return to [facility] at discharge  12/3-end date abx 12/11.  Daughter in room today, updated. No questions.    12/5-doing well, no issues  12/6-no issues will complete abx in 5 days.   12/8-doing well, looks a little dehydrated. Will give small bolus  12/9-No complaints  12/10-home tomorrow    12/11-stable for discharge. Follow up with Dr. Butcher in 1 week. Follow up PCP. Continue wound care.

## 2023-12-02 PROCEDURE — 25000003 PHARM REV CODE 250: Performed by: HOSPITALIST

## 2023-12-02 PROCEDURE — 25000003 PHARM REV CODE 250: Performed by: STUDENT IN AN ORGANIZED HEALTH CARE EDUCATION/TRAINING PROGRAM

## 2023-12-02 PROCEDURE — 99232 PR SUBSEQUENT HOSPITAL CARE,LEVL II: ICD-10-PCS | Mod: ,,, | Performed by: STUDENT IN AN ORGANIZED HEALTH CARE EDUCATION/TRAINING PROGRAM

## 2023-12-02 PROCEDURE — 11000001 HC ACUTE MED/SURG PRIVATE ROOM

## 2023-12-02 PROCEDURE — 25000003 PHARM REV CODE 250: Performed by: INTERNAL MEDICINE

## 2023-12-02 PROCEDURE — 99232 SBSQ HOSP IP/OBS MODERATE 35: CPT | Mod: ,,, | Performed by: STUDENT IN AN ORGANIZED HEALTH CARE EDUCATION/TRAINING PROGRAM

## 2023-12-02 PROCEDURE — 63600175 PHARM REV CODE 636 W HCPCS: Performed by: STUDENT IN AN ORGANIZED HEALTH CARE EDUCATION/TRAINING PROGRAM

## 2023-12-02 RX ADMIN — DOCUSATE SODIUM 200 MG: 100 CAPSULE, LIQUID FILLED ORAL at 09:12

## 2023-12-02 RX ADMIN — MICONAZOLE NITRATE 2 % TOPICAL POWDER: at 09:12

## 2023-12-02 RX ADMIN — VANCOMYCIN HYDROCHLORIDE 1500 MG: 1 INJECTION, POWDER, LYOPHILIZED, FOR SOLUTION INTRAVENOUS at 09:12

## 2023-12-02 RX ADMIN — CYANOCOBALAMIN TAB 500 MCG 2000 MCG: 500 TAB at 09:12

## 2023-12-02 RX ADMIN — CARVEDILOL 6.25 MG: 6.25 TABLET, FILM COATED ORAL at 09:12

## 2023-12-02 RX ADMIN — MIRTAZAPINE 7.5 MG: 7.5 TABLET, FILM COATED ORAL at 08:12

## 2023-12-02 RX ADMIN — TICAGRELOR 90 MG: 90 TABLET ORAL at 08:12

## 2023-12-02 RX ADMIN — MICONAZOLE NITRATE 2 % TOPICAL POWDER: at 08:12

## 2023-12-02 RX ADMIN — MUPIROCIN: 20 OINTMENT TOPICAL at 09:12

## 2023-12-02 RX ADMIN — MEMANTINE 10 MG: 5 TABLET ORAL at 08:12

## 2023-12-02 RX ADMIN — FERROUS SULFATE TAB 325 MG (65 MG ELEMENTAL FE) 1 EACH: 325 (65 FE) TAB at 08:12

## 2023-12-02 RX ADMIN — POLYETHYLENE GLYCOL 3350 17 G: 17 POWDER, FOR SOLUTION ORAL at 09:12

## 2023-12-02 RX ADMIN — FERROUS SULFATE TAB 325 MG (65 MG ELEMENTAL FE) 1 EACH: 325 (65 FE) TAB at 09:12

## 2023-12-02 RX ADMIN — DOCUSATE SODIUM 200 MG: 100 CAPSULE, LIQUID FILLED ORAL at 08:12

## 2023-12-02 RX ADMIN — ATORVASTATIN CALCIUM 40 MG: 40 TABLET, FILM COATED ORAL at 08:12

## 2023-12-02 RX ADMIN — ACETAMINOPHEN 1000 MG: 500 TABLET, FILM COATED ORAL at 02:12

## 2023-12-02 RX ADMIN — DONEPEZIL HYDROCHLORIDE 10 MG: 5 TABLET, FILM COATED ORAL at 08:12

## 2023-12-02 RX ADMIN — FAMOTIDINE 20 MG: 20 TABLET, FILM COATED ORAL at 09:12

## 2023-12-02 RX ADMIN — LISINOPRIL 10 MG: 10 TABLET ORAL at 09:12

## 2023-12-02 RX ADMIN — MEMANTINE 10 MG: 5 TABLET ORAL at 09:12

## 2023-12-02 RX ADMIN — MUPIROCIN: 20 OINTMENT TOPICAL at 08:12

## 2023-12-02 RX ADMIN — ASPIRIN 81 MG: 81 TABLET, COATED ORAL at 08:12

## 2023-12-02 RX ADMIN — ACETAMINOPHEN 1000 MG: 500 TABLET, FILM COATED ORAL at 09:12

## 2023-12-02 RX ADMIN — PANTOPRAZOLE SODIUM 20 MG: 20 TABLET, DELAYED RELEASE ORAL at 09:12

## 2023-12-02 RX ADMIN — ACETAMINOPHEN 1000 MG: 500 TABLET, FILM COATED ORAL at 10:12

## 2023-12-02 RX ADMIN — TICAGRELOR 90 MG: 90 TABLET ORAL at 09:12

## 2023-12-02 NOTE — PROGRESS NOTES
[facility] Medicine  Progress Note    Patient Name: Riley Fox  MRN: [id number]  Patient Class: IP- Inpatient   Admission Date: 11/28/2023  Length of Stay: 4 days  Attending Physician: Chuy Washburn DO  Primary Care Provider: Paulette, Primary Doctor        Subjective:     Principal Problem:Abscess, toe, left        HPI:  Patient is a 83-year-old male with past medical history of hypertension, spinal stenosis, peripheral arterial disease, questionable dementia, chronic kidney disease, who presents to the emergency room for evaluation of left great toe osteomyelitis along with cellulitis of left lower extremity.  Patient is a long-term resident at the nursing home secondary to his spinal stenosis and resulting immobility, it appears as though he has some contractures of his lower extremity, his son is at bedside, he tells me that the patient has had some memory issues lately, he also tells me that he has history of CVA in the past.  Patient endorses pain in bilateral lower extremities, mostly on the affected side that is the left great toe, although the right lower extremity also appears to have some wounds.  General surgery evaluated the patient in the ER and recommended the patient to take to the OR for debridement and possible amputation of the affected toe, followed by admission to [facility] nurse for long-term wound care and IV antibiotics.  Patient otherwise is hemodynamically stable, denies any chest pain, denies any shortness of breath, denies any fevers chills nausea vomiting or diarrhea.    Overview/Hospital Course:  12/1- chart reviewed, continue vanco for 10 days  12/2-no complaints, doing well. Continue abx. Will return to [facility] at discharge    Interval History: naeo    Review of Systems   Constitutional: Negative.  Negative for chills and fever.   HENT: Negative.     Eyes:  Negative for pain and redness.   Respiratory:   Negative for cough, chest tightness, shortness of breath and wheezing.    Cardiovascular:  Negative for chest pain and palpitations.   Gastrointestinal:  Negative for abdominal pain, diarrhea, nausea and vomiting.   Endocrine: Negative for cold intolerance, heat intolerance and polyuria.   Genitourinary:  Negative for difficulty urinating, dysuria, frequency and hematuria.   Musculoskeletal:  Negative for arthralgias, back pain, myalgias, neck pain and neck stiffness.   Skin:  Negative for pallor and rash.   Allergic/Immunologic: Negative.    Neurological:  Negative for dizziness, syncope, weakness, numbness and headaches.   Hematological:  Negative for adenopathy.   Psychiatric/Behavioral:  Negative for agitation, confusion and hallucinations. The patient is not nervous/anxious.      Objective:     Vital Signs (Most Recent):  Temp: 97.6 °F (36.4 °C) (12/02/23 0912)  Pulse: 73 (12/02/23 0800)  Resp: 18 (12/02/23 0800)  BP: (!) 117/46 (12/02/23 0912)  SpO2: 96 % (12/02/23 0800) Vital Signs (24h Range):  Temp:  [97.4 °F (36.3 °C)-98.6 °F (37 °C)] 97.6 °F (36.4 °C)  Pulse:  [59-73] 73  Resp:  [18] 18  SpO2:  [94 %-98 %] 96 %  BP: (117-145)/(46-65) 117/46     Weight: 79.7 kg (175 lb 9.6 oz)  Body mass index is 23.17 kg/m².    Intake/Output Summary (Last 24 hours) at 12/2/2023 1419  Last data filed at 12/2/2023 0459  Gross per 24 hour   Intake --   Output 800 ml   Net -800 ml           Physical Exam  Vitals reviewed.   Cardiovascular:      Rate and Rhythm: Normal rate and regular rhythm.      Heart sounds: Normal heart sounds.   Pulmonary:      Effort: Pulmonary effort is normal.      Breath sounds: Normal breath sounds.   Abdominal:      General: Abdomen is flat. Bowel sounds are normal.      Palpations: Abdomen is soft.   Musculoskeletal:         General: Signs of injury present.      Comments: Wrapped left foot   Neurological:      Mental Status: He is alert.             Significant Labs: All pertinent labs within the  past 24 hours have been reviewed.    Significant Imaging: I have reviewed all pertinent imaging results/findings within the past 24 hours.    Assessment/Plan:      * Abscess, toe, left  Pt seen in ER for surgical eval, gen surg recommend iv abx and OR  Pt to be admitted to [facility] post intervention  Cont iv abs  Wound care  Follow cx    11/29 - Now s/p amputation.  Continue abx.   11/30 - S/p L great toe amputation.  Continue abx for 1-2 weeks for soft tissue infection of the foot.   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  Stable    HTN (hypertension)  Cont home meds  Chronic controlled    PVD (peripheral vascular disease)  Cont DAPT  Statin  Monitor wound healing.       Cellulitis  Vanco, tentative end date 12/10        VTE Risk Mitigation (From admission, onward)      None            Discharge Planning   JORI:      Code Status: Full Code   Is the patient medically ready for discharge?:     Reason for patient still in hospital (select all that apply): Treatment  Discharge Plan A: Return to nursing home                  Chuy Washburn DO  Department of Hospital Medicine   [facility]

## 2023-12-02 NOTE — SUBJECTIVE & OBJECTIVE
Interval History: naeo    Review of Systems   Constitutional: Negative.  Negative for chills and fever.   HENT: Negative.     Eyes:  Negative for pain and redness.   Respiratory:  Negative for cough, chest tightness, shortness of breath and wheezing.    Cardiovascular:  Negative for chest pain and palpitations.   Gastrointestinal:  Negative for abdominal pain, diarrhea, nausea and vomiting.   Endocrine: Negative for cold intolerance, heat intolerance and polyuria.   Genitourinary:  Negative for difficulty urinating, dysuria, frequency and hematuria.   Musculoskeletal:  Negative for arthralgias, back pain, myalgias, neck pain and neck stiffness.   Skin:  Negative for pallor and rash.   Allergic/Immunologic: Negative.    Neurological:  Negative for dizziness, syncope, weakness, numbness and headaches.   Hematological:  Negative for adenopathy.   Psychiatric/Behavioral:  Negative for agitation, confusion and hallucinations. The patient is not nervous/anxious.      Objective:     Vital Signs (Most Recent):  Temp: 97.6 °F (36.4 °C) (12/02/23 0912)  Pulse: 73 (12/02/23 0800)  Resp: 18 (12/02/23 0800)  BP: (!) 117/46 (12/02/23 0912)  SpO2: 96 % (12/02/23 0800) Vital Signs (24h Range):  Temp:  [97.4 °F (36.3 °C)-98.6 °F (37 °C)] 97.6 °F (36.4 °C)  Pulse:  [59-73] 73  Resp:  [18] 18  SpO2:  [94 %-98 %] 96 %  BP: (117-145)/(46-65) 117/46     Weight: 79.7 kg (175 lb 9.6 oz)  Body mass index is 23.17 kg/m².    Intake/Output Summary (Last 24 hours) at 12/2/2023 1419  Last data filed at 12/2/2023 0459  Gross per 24 hour   Intake --   Output 800 ml   Net -800 ml           Physical Exam  Vitals reviewed.   Cardiovascular:      Rate and Rhythm: Normal rate and regular rhythm.      Heart sounds: Normal heart sounds.   Pulmonary:      Effort: Pulmonary effort is normal.      Breath sounds: Normal breath sounds.   Abdominal:      General: Abdomen is flat. Bowel sounds are normal.      Palpations: Abdomen is soft.   Musculoskeletal:          General: Signs of injury present.      Comments: Wrapped left foot   Neurological:      Mental Status: He is alert.             Significant Labs: All pertinent labs within the past 24 hours have been reviewed.    Significant Imaging: I have reviewed all pertinent imaging results/findings within the past 24 hours.

## 2023-12-03 PROCEDURE — 99232 PR SUBSEQUENT HOSPITAL CARE,LEVL II: ICD-10-PCS | Mod: ,,, | Performed by: STUDENT IN AN ORGANIZED HEALTH CARE EDUCATION/TRAINING PROGRAM

## 2023-12-03 PROCEDURE — 25000003 PHARM REV CODE 250: Performed by: INTERNAL MEDICINE

## 2023-12-03 PROCEDURE — 11000001 HC ACUTE MED/SURG PRIVATE ROOM

## 2023-12-03 PROCEDURE — 99232 SBSQ HOSP IP/OBS MODERATE 35: CPT | Mod: ,,, | Performed by: STUDENT IN AN ORGANIZED HEALTH CARE EDUCATION/TRAINING PROGRAM

## 2023-12-03 PROCEDURE — 25000003 PHARM REV CODE 250: Performed by: HOSPITALIST

## 2023-12-03 RX ORDER — OXYCODONE HYDROCHLORIDE 5 MG/1
5 TABLET ORAL EVERY 4 HOURS PRN
Status: DISCONTINUED | OUTPATIENT
Start: 2023-12-03 | End: 2023-12-11 | Stop reason: HOSPADM

## 2023-12-03 RX ADMIN — ACETAMINOPHEN 1000 MG: 500 TABLET, FILM COATED ORAL at 08:12

## 2023-12-03 RX ADMIN — MIRTAZAPINE 7.5 MG: 7.5 TABLET, FILM COATED ORAL at 08:12

## 2023-12-03 RX ADMIN — CARVEDILOL 6.25 MG: 6.25 TABLET, FILM COATED ORAL at 08:12

## 2023-12-03 RX ADMIN — LISINOPRIL 10 MG: 10 TABLET ORAL at 08:12

## 2023-12-03 RX ADMIN — PANTOPRAZOLE SODIUM 20 MG: 20 TABLET, DELAYED RELEASE ORAL at 08:12

## 2023-12-03 RX ADMIN — MUPIROCIN: 20 OINTMENT TOPICAL at 08:12

## 2023-12-03 RX ADMIN — MEMANTINE 10 MG: 5 TABLET ORAL at 08:12

## 2023-12-03 RX ADMIN — FERROUS SULFATE TAB 325 MG (65 MG ELEMENTAL FE) 1 EACH: 325 (65 FE) TAB at 08:12

## 2023-12-03 RX ADMIN — TICAGRELOR 90 MG: 90 TABLET ORAL at 08:12

## 2023-12-03 RX ADMIN — MICONAZOLE NITRATE 2 % TOPICAL POWDER: at 08:12

## 2023-12-03 RX ADMIN — CYANOCOBALAMIN TAB 500 MCG 2000 MCG: 500 TAB at 08:12

## 2023-12-03 RX ADMIN — POLYETHYLENE GLYCOL 3350 17 G: 17 POWDER, FOR SOLUTION ORAL at 08:12

## 2023-12-03 RX ADMIN — ASPIRIN 81 MG: 81 TABLET, COATED ORAL at 08:12

## 2023-12-03 RX ADMIN — FAMOTIDINE 20 MG: 20 TABLET, FILM COATED ORAL at 08:12

## 2023-12-03 RX ADMIN — ATORVASTATIN CALCIUM 40 MG: 40 TABLET, FILM COATED ORAL at 08:12

## 2023-12-03 RX ADMIN — DONEPEZIL HYDROCHLORIDE 10 MG: 5 TABLET, FILM COATED ORAL at 08:12

## 2023-12-03 RX ADMIN — DOCUSATE SODIUM 200 MG: 100 CAPSULE, LIQUID FILLED ORAL at 08:12

## 2023-12-03 RX ADMIN — ACETAMINOPHEN 1000 MG: 500 TABLET, FILM COATED ORAL at 02:12

## 2023-12-03 NOTE — SUBJECTIVE & OBJECTIVE
Interval History: naeo    Review of Systems   Constitutional: Negative.  Negative for chills and fever.   HENT: Negative.     Eyes:  Negative for pain and redness.   Respiratory:  Negative for cough, chest tightness, shortness of breath and wheezing.    Cardiovascular:  Negative for chest pain and palpitations.   Gastrointestinal:  Negative for abdominal pain, diarrhea, nausea and vomiting.   Endocrine: Negative for cold intolerance, heat intolerance and polyuria.   Genitourinary:  Negative for difficulty urinating, dysuria, frequency and hematuria.   Musculoskeletal:  Negative for arthralgias, back pain, myalgias, neck pain and neck stiffness.   Skin:  Negative for pallor and rash.   Allergic/Immunologic: Negative.    Neurological:  Negative for dizziness, syncope, weakness, numbness and headaches.   Hematological:  Negative for adenopathy.   Psychiatric/Behavioral:  Negative for agitation, confusion and hallucinations. The patient is not nervous/anxious.      Objective:     Vital Signs (Most Recent):  Temp: 97.7 °F (36.5 °C) (12/03/23 0000)  Pulse: 83 (12/03/23 0000)  Resp: 20 (12/03/23 0000)  BP: (!) 100/52 (12/03/23 0000)  SpO2: 95 % (12/03/23 0000) Vital Signs (24h Range):  Temp:  [97.7 °F (36.5 °C)-98.2 °F (36.8 °C)] 97.7 °F (36.5 °C)  Pulse:  [63-83] 83  Resp:  [18-20] 20  SpO2:  [95 %-98 %] 95 %  BP: (100-119)/(45-52) 100/52     Weight: 79.7 kg (175 lb 9.6 oz)  Body mass index is 23.17 kg/m².    Intake/Output Summary (Last 24 hours) at 12/3/2023 0929  Last data filed at 12/2/2023 2230  Gross per 24 hour   Intake 370 ml   Output 300 ml   Net 70 ml           Physical Exam  Vitals reviewed.   Cardiovascular:      Rate and Rhythm: Normal rate and regular rhythm.      Heart sounds: Normal heart sounds.   Pulmonary:      Effort: Pulmonary effort is normal.      Breath sounds: Normal breath sounds.   Abdominal:      General: Abdomen is flat. Bowel sounds are normal.      Palpations: Abdomen is soft.    Musculoskeletal:         General: Signs of injury present.      Comments: Wrapped left foot   Neurological:      Mental Status: He is alert.             Significant Labs: All pertinent labs within the past 24 hours have been reviewed.    Significant Imaging: I have reviewed all pertinent imaging results/findings within the past 24 hours.

## 2023-12-03 NOTE — PROGRESS NOTES
[facility] Medicine  Progress Note    Patient Name: Riley Fox  MRN: [id number]  Patient Class: IP- Inpatient   Admission Date: 11/28/2023  Length of Stay: 5 days  Attending Physician: Chuy Washburn DO  Primary Care Provider: Paulette, Primary Doctor        Subjective:     Principal Problem:Abscess, toe, left        HPI:  Patient is a 83-year-old male with past medical history of hypertension, spinal stenosis, peripheral arterial disease, questionable dementia, chronic kidney disease, who presents to the emergency room for evaluation of left great toe osteomyelitis along with cellulitis of left lower extremity.  Patient is a long-term resident at the nursing home secondary to his spinal stenosis and resulting immobility, it appears as though he has some contractures of his lower extremity, his son is at bedside, he tells me that the patient has had some memory issues lately, he also tells me that he has history of CVA in the past.  Patient endorses pain in bilateral lower extremities, mostly on the affected side that is the left great toe, although the right lower extremity also appears to have some wounds.  General surgery evaluated the patient in the ER and recommended the patient to take to the OR for debridement and possible amputation of the affected toe, followed by admission to [facility] nurse for long-term wound care and IV antibiotics.  Patient otherwise is hemodynamically stable, denies any chest pain, denies any shortness of breath, denies any fevers chills nausea vomiting or diarrhea.    Overview/Hospital Course:  12/1- chart reviewed, continue vanco for 10 days  12/2-no complaints, doing well. Continue abx. Will return to [facility] at discharge  12/3-end date abx 12/11.  Daughter in room today, updated. No questions.    Interval History: naeo    Review of Systems   Constitutional: Negative.  Negative for chills and fever.   HENT:  Negative.     Eyes:  Negative for pain and redness.   Respiratory:  Negative for cough, chest tightness, shortness of breath and wheezing.    Cardiovascular:  Negative for chest pain and palpitations.   Gastrointestinal:  Negative for abdominal pain, diarrhea, nausea and vomiting.   Endocrine: Negative for cold intolerance, heat intolerance and polyuria.   Genitourinary:  Negative for difficulty urinating, dysuria, frequency and hematuria.   Musculoskeletal:  Negative for arthralgias, back pain, myalgias, neck pain and neck stiffness.   Skin:  Negative for pallor and rash.   Allergic/Immunologic: Negative.    Neurological:  Negative for dizziness, syncope, weakness, numbness and headaches.   Hematological:  Negative for adenopathy.   Psychiatric/Behavioral:  Negative for agitation, confusion and hallucinations. The patient is not nervous/anxious.      Objective:     Vital Signs (Most Recent):  Temp: 97.7 °F (36.5 °C) (12/03/23 0000)  Pulse: 83 (12/03/23 0000)  Resp: 20 (12/03/23 0000)  BP: (!) 100/52 (12/03/23 0000)  SpO2: 95 % (12/03/23 0000) Vital Signs (24h Range):  Temp:  [97.7 °F (36.5 °C)-98.2 °F (36.8 °C)] 97.7 °F (36.5 °C)  Pulse:  [63-83] 83  Resp:  [18-20] 20  SpO2:  [95 %-98 %] 95 %  BP: (100-119)/(45-52) 100/52     Weight: 79.7 kg (175 lb 9.6 oz)  Body mass index is 23.17 kg/m².    Intake/Output Summary (Last 24 hours) at 12/3/2023 0929  Last data filed at 12/2/2023 2230  Gross per 24 hour   Intake 370 ml   Output 300 ml   Net 70 ml           Physical Exam  Vitals reviewed.   Cardiovascular:      Rate and Rhythm: Normal rate and regular rhythm.      Heart sounds: Normal heart sounds.   Pulmonary:      Effort: Pulmonary effort is normal.      Breath sounds: Normal breath sounds.   Abdominal:      General: Abdomen is flat. Bowel sounds are normal.      Palpations: Abdomen is soft.   Musculoskeletal:         General: Signs of injury present.      Comments: Wrapped left foot   Neurological:      Mental  Status: He is alert.             Significant Labs: All pertinent labs within the past 24 hours have been reviewed.    Significant Imaging: I have reviewed all pertinent imaging results/findings within the past 24 hours.    Assessment/Plan:      * Abscess, toe, left  Pt seen in ER for surgical eval, gen surg recommend iv abx and OR  Pt to be admitted to [facility] post intervention  Cont iv abs  Wound care  Follow cx    11/29 - Now s/p amputation.  Continue abx.   11/30 - S/p L great toe amputation.  Continue abx for 1-2 weeks for soft tissue infection of the foot.   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  Stable    HTN (hypertension)  Cont home meds  Chronic controlled    PVD (peripheral vascular disease)  Cont DAPT  Statin  Monitor wound healing.       Cellulitis  Vanco, tentative end date 12/10        VTE Risk Mitigation (From admission, onward)      None            Discharge Planning   JORI:      Code Status: Full Code   Is the patient medically ready for discharge?:     Reason for patient still in hospital (select all that apply): Treatment  Discharge Plan A: Return to nursing home                  Chuy Washburn DO  Department of Hospital Medicine   [facility]

## 2023-12-04 LAB
ANION GAP SERPL CALCULATED.3IONS-SCNC: 10 MMOL/L (ref 7–16)
BASOPHILS # BLD AUTO: 0.04 K/UL (ref 0–0.2)
BASOPHILS NFR BLD AUTO: 0.7 % (ref 0–1)
BUN SERPL-MCNC: 25 MG/DL (ref 7–18)
BUN/CREAT SERPL: 16 (ref 6–20)
CALCIUM SERPL-MCNC: 8.8 MG/DL (ref 8.5–10.1)
CHLORIDE SERPL-SCNC: 113 MMOL/L (ref 98–107)
CO2 SERPL-SCNC: 23 MMOL/L (ref 21–32)
CREAT SERPL-MCNC: 1.55 MG/DL (ref 0.7–1.3)
DIFFERENTIAL METHOD BLD: ABNORMAL
EGFR (NO RACE VARIABLE) (RUSH/TITUS): 45 ML/MIN/1.73M2
EOSINOPHIL # BLD AUTO: 0.19 K/UL (ref 0–0.5)
EOSINOPHIL NFR BLD AUTO: 3.4 % (ref 1–4)
ERYTHROCYTE [DISTWIDTH] IN BLOOD BY AUTOMATED COUNT: 16.1 % (ref 11.5–14.5)
GLUCOSE SERPL-MCNC: 86 MG/DL (ref 74–106)
HCT VFR BLD AUTO: 31.8 % (ref 40–54)
HGB BLD-MCNC: 9.7 G/DL (ref 13.5–18)
IMM GRANULOCYTES # BLD AUTO: 0.08 K/UL (ref 0–0.04)
IMM GRANULOCYTES NFR BLD: 1.4 % (ref 0–0.4)
LYMPHOCYTES # BLD AUTO: 0.82 K/UL (ref 1–4.8)
LYMPHOCYTES NFR BLD AUTO: 14.7 % (ref 27–41)
MCH RBC QN AUTO: 27.6 PG (ref 27–31)
MCHC RBC AUTO-ENTMCNC: 30.5 G/DL (ref 32–36)
MCV RBC AUTO: 90.3 FL (ref 80–96)
MONOCYTES # BLD AUTO: 0.49 K/UL (ref 0–0.8)
MONOCYTES NFR BLD AUTO: 8.8 % (ref 2–6)
MPC BLD CALC-MCNC: 9.9 FL (ref 9.4–12.4)
NEUTROPHILS # BLD AUTO: 3.95 K/UL (ref 1.8–7.7)
NEUTROPHILS NFR BLD AUTO: 71 % (ref 53–65)
NRBC # BLD AUTO: 0 X10E3/UL
NRBC, AUTO (.00): 0 %
PLATELET # BLD AUTO: 249 K/UL (ref 150–400)
POTASSIUM SERPL-SCNC: 4.2 MMOL/L (ref 3.5–5.1)
RBC # BLD AUTO: 3.52 M/UL (ref 4.6–6.2)
SODIUM SERPL-SCNC: 142 MMOL/L (ref 136–145)
VANCOMYCIN TROUGH SERPL-MCNC: 14.2 ΜG/ML (ref 10–20)
WBC # BLD AUTO: 5.57 K/UL (ref 4.5–11)

## 2023-12-04 PROCEDURE — 25000003 PHARM REV CODE 250: Performed by: HOSPITALIST

## 2023-12-04 PROCEDURE — 99231 SBSQ HOSP IP/OBS SF/LOW 25: CPT | Mod: ,,, | Performed by: STUDENT IN AN ORGANIZED HEALTH CARE EDUCATION/TRAINING PROGRAM

## 2023-12-04 PROCEDURE — 25000003 PHARM REV CODE 250: Performed by: STUDENT IN AN ORGANIZED HEALTH CARE EDUCATION/TRAINING PROGRAM

## 2023-12-04 PROCEDURE — 63600175 PHARM REV CODE 636 W HCPCS: Performed by: STUDENT IN AN ORGANIZED HEALTH CARE EDUCATION/TRAINING PROGRAM

## 2023-12-04 PROCEDURE — 80202 ASSAY OF VANCOMYCIN: CPT | Performed by: FAMILY MEDICINE

## 2023-12-04 PROCEDURE — 11000001 HC ACUTE MED/SURG PRIVATE ROOM

## 2023-12-04 PROCEDURE — 99231 PR SUBSEQUENT HOSPITAL CARE,LEVL I: ICD-10-PCS | Mod: ,,, | Performed by: STUDENT IN AN ORGANIZED HEALTH CARE EDUCATION/TRAINING PROGRAM

## 2023-12-04 PROCEDURE — 76937 US GUIDE VASCULAR ACCESS: CPT

## 2023-12-04 PROCEDURE — 36410 VNPNXR 3YR/> PHY/QHP DX/THER: CPT

## 2023-12-04 PROCEDURE — 85025 COMPLETE CBC W/AUTO DIFF WBC: CPT | Performed by: STUDENT IN AN ORGANIZED HEALTH CARE EDUCATION/TRAINING PROGRAM

## 2023-12-04 PROCEDURE — C1751 CATH, INF, PER/CENT/MIDLINE: HCPCS

## 2023-12-04 PROCEDURE — 25000003 PHARM REV CODE 250: Performed by: INTERNAL MEDICINE

## 2023-12-04 PROCEDURE — 80048 BASIC METABOLIC PNL TOTAL CA: CPT | Performed by: STUDENT IN AN ORGANIZED HEALTH CARE EDUCATION/TRAINING PROGRAM

## 2023-12-04 RX ADMIN — VANCOMYCIN HYDROCHLORIDE 1500 MG: 1 INJECTION, POWDER, LYOPHILIZED, FOR SOLUTION INTRAVENOUS at 08:12

## 2023-12-04 RX ADMIN — TICAGRELOR 90 MG: 90 TABLET ORAL at 08:12

## 2023-12-04 RX ADMIN — ATORVASTATIN CALCIUM 40 MG: 40 TABLET, FILM COATED ORAL at 08:12

## 2023-12-04 RX ADMIN — ACETAMINOPHEN 1000 MG: 500 TABLET, FILM COATED ORAL at 08:12

## 2023-12-04 RX ADMIN — FERROUS SULFATE TAB 325 MG (65 MG ELEMENTAL FE) 1 EACH: 325 (65 FE) TAB at 08:12

## 2023-12-04 RX ADMIN — DOCUSATE SODIUM 200 MG: 100 CAPSULE, LIQUID FILLED ORAL at 08:12

## 2023-12-04 RX ADMIN — LISINOPRIL 10 MG: 10 TABLET ORAL at 08:12

## 2023-12-04 RX ADMIN — MICONAZOLE NITRATE 2 % TOPICAL POWDER: at 08:12

## 2023-12-04 RX ADMIN — PANTOPRAZOLE SODIUM 20 MG: 20 TABLET, DELAYED RELEASE ORAL at 08:12

## 2023-12-04 RX ADMIN — ASPIRIN 81 MG: 81 TABLET, COATED ORAL at 08:12

## 2023-12-04 RX ADMIN — CYANOCOBALAMIN TAB 500 MCG 2000 MCG: 500 TAB at 08:12

## 2023-12-04 RX ADMIN — CARVEDILOL 6.25 MG: 6.25 TABLET, FILM COATED ORAL at 08:12

## 2023-12-04 RX ADMIN — MEMANTINE 10 MG: 5 TABLET ORAL at 08:12

## 2023-12-04 RX ADMIN — ACETAMINOPHEN 1000 MG: 500 TABLET, FILM COATED ORAL at 03:12

## 2023-12-04 RX ADMIN — FAMOTIDINE 20 MG: 20 TABLET, FILM COATED ORAL at 08:12

## 2023-12-04 RX ADMIN — MIRTAZAPINE 7.5 MG: 7.5 TABLET, FILM COATED ORAL at 08:12

## 2023-12-04 RX ADMIN — DONEPEZIL HYDROCHLORIDE 10 MG: 5 TABLET, FILM COATED ORAL at 08:12

## 2023-12-04 RX ADMIN — POLYETHYLENE GLYCOL 3350 17 G: 17 POWDER, FOR SOLUTION ORAL at 08:12

## 2023-12-04 NOTE — PROGRESS NOTES
[facility] Medicine  Progress Note    Patient Name: Riley Fox  MRN: [id number]  Patient Class: IP- Inpatient   Admission Date: 11/28/2023  Length of Stay: 6 days  Attending Physician: Chuy Washburn DO  Primary Care Provider: Paulette, Primary Doctor        Subjective:     Principal Problem:Abscess, toe, left        HPI:  Patient is a 83-year-old male with past medical history of hypertension, spinal stenosis, peripheral arterial disease, questionable dementia, chronic kidney disease, who presents to the emergency room for evaluation of left great toe osteomyelitis along with cellulitis of left lower extremity.  Patient is a long-term resident at the nursing home secondary to his spinal stenosis and resulting immobility, it appears as though he has some contractures of his lower extremity, his son is at bedside, he tells me that the patient has had some memory issues lately, he also tells me that he has history of CVA in the past.  Patient endorses pain in bilateral lower extremities, mostly on the affected side that is the left great toe, although the right lower extremity also appears to have some wounds.  General surgery evaluated the patient in the ER and recommended the patient to take to the OR for debridement and possible amputation of the affected toe, followed by admission to [facility] nurse for long-term wound care and IV antibiotics.  Patient otherwise is hemodynamically stable, denies any chest pain, denies any shortness of breath, denies any fevers chills nausea vomiting or diarrhea.    Overview/Hospital Course:  12/1- chart reviewed, continue vanco for 10 days  12/2-no complaints, doing well. Continue abx. Will return to [facility] at discharge  12/3-end date abx 12/11.  Daughter in room today, updated. No questions.    Interval History: naeo    Review of Systems   Constitutional: Negative.  Negative for chills and fever.   HENT:  Negative.     Eyes:  Negative for pain and redness.   Respiratory:  Negative for cough, chest tightness, shortness of breath and wheezing.    Cardiovascular:  Negative for chest pain and palpitations.   Gastrointestinal:  Negative for abdominal pain, diarrhea, nausea and vomiting.   Endocrine: Negative for cold intolerance, heat intolerance and polyuria.   Genitourinary:  Negative for difficulty urinating, dysuria, frequency and hematuria.   Musculoskeletal:  Negative for arthralgias, back pain, myalgias, neck pain and neck stiffness.   Skin:  Negative for pallor and rash.   Allergic/Immunologic: Negative.    Neurological:  Negative for dizziness, syncope, weakness, numbness and headaches.   Hematological:  Negative for adenopathy.   Psychiatric/Behavioral:  Negative for agitation, confusion and hallucinations. The patient is not nervous/anxious.      Objective:     Vital Signs (Most Recent):  Temp: 98.3 °F (36.8 °C) (12/04/23 0800)  Pulse: 70 (12/04/23 0800)  Resp: 18 (12/04/23 0800)  BP: (!) 124/50 (12/04/23 0800)  SpO2: 99 % (12/04/23 0800) Vital Signs (24h Range):  Temp:  [97.5 °F (36.4 °C)-98.7 °F (37.1 °C)] 98.3 °F (36.8 °C)  Pulse:  [70-75] 70  Resp:  [16-20] 18  SpO2:  [97 %-99 %] 99 %  BP: (119-132)/(50-59) 124/50     Weight: 79.7 kg (175 lb 9.6 oz)  Body mass index is 23.17 kg/m².    Intake/Output Summary (Last 24 hours) at 12/4/2023 1548  Last data filed at 12/4/2023 1209  Gross per 24 hour   Intake 600 ml   Output 1300 ml   Net -700 ml           Physical Exam  Vitals reviewed.   Cardiovascular:      Rate and Rhythm: Normal rate and regular rhythm.      Heart sounds: Normal heart sounds.   Pulmonary:      Effort: Pulmonary effort is normal.      Breath sounds: Normal breath sounds.   Abdominal:      General: Abdomen is flat. Bowel sounds are normal.      Palpations: Abdomen is soft.   Musculoskeletal:         General: Signs of injury present.      Comments: Wrapped left foot   Neurological:      Mental  Status: He is alert.             Significant Labs: All pertinent labs within the past 24 hours have been reviewed.    Significant Imaging: I have reviewed all pertinent imaging results/findings within the past 24 hours.    Assessment/Plan:      * Abscess, toe, left  Pt seen in ER for surgical eval, gen surg recommend iv abx and OR  [facility]  Cont iv abs  Wound care  Follow cx    11/29 - Now s/p amputation.  Continue abx.   11/30 - S/p L great toe amputation.  Continue abx for 1-2 weeks for soft tissue infection of the foot.   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  Stable    HTN (hypertension)  Cont home meds  Chronic controlled    PVD (peripheral vascular disease)  Cont DAPT  Statin  Monitor wound healing.       Cellulitis  Vanco, tentative end date 12/10        VTE Risk Mitigation (From admission, onward)      None            Discharge Planning   JORI:      Code Status: Full Code   Is the patient medically ready for discharge?:     Reason for patient still in hospital (select all that apply): Treatment  Discharge Plan A: Return to nursing home                  Chuy Washburn DO  Department of Hospital Medicine   [facility]

## 2023-12-04 NOTE — PROGRESS NOTES
Wound care note;  Patient skin was evaluated today  Patient in bed, Alert.   Left amp great toe incision line with dark red slow granular tissue, stitches noted. Applied Opti foam to incision line,Redness to outer edges. Cont with dry dressing.  Has purple.maroon discolored areas to Left anterior foot and lateral foot. Applied Aquacel Foam border to both areas.   Skin integrity may be compromised due to incontinence of bowel and bladder, skin moisture, Luigi scale 13, limited mobility,etc.   Cont turn protocol  Waffle boots   On Redistribution mattress with low air loss pump.  Wound care team to follow

## 2023-12-04 NOTE — SUBJECTIVE & OBJECTIVE
Interval History: naeo    Review of Systems   Constitutional: Negative.  Negative for chills and fever.   HENT: Negative.     Eyes:  Negative for pain and redness.   Respiratory:  Negative for cough, chest tightness, shortness of breath and wheezing.    Cardiovascular:  Negative for chest pain and palpitations.   Gastrointestinal:  Negative for abdominal pain, diarrhea, nausea and vomiting.   Endocrine: Negative for cold intolerance, heat intolerance and polyuria.   Genitourinary:  Negative for difficulty urinating, dysuria, frequency and hematuria.   Musculoskeletal:  Negative for arthralgias, back pain, myalgias, neck pain and neck stiffness.   Skin:  Negative for pallor and rash.   Allergic/Immunologic: Negative.    Neurological:  Negative for dizziness, syncope, weakness, numbness and headaches.   Hematological:  Negative for adenopathy.   Psychiatric/Behavioral:  Negative for agitation, confusion and hallucinations. The patient is not nervous/anxious.      Objective:     Vital Signs (Most Recent):  Temp: 98.3 °F (36.8 °C) (12/04/23 0800)  Pulse: 70 (12/04/23 0800)  Resp: 18 (12/04/23 0800)  BP: (!) 124/50 (12/04/23 0800)  SpO2: 99 % (12/04/23 0800) Vital Signs (24h Range):  Temp:  [97.5 °F (36.4 °C)-98.7 °F (37.1 °C)] 98.3 °F (36.8 °C)  Pulse:  [70-75] 70  Resp:  [16-20] 18  SpO2:  [97 %-99 %] 99 %  BP: (119-132)/(50-59) 124/50     Weight: 79.7 kg (175 lb 9.6 oz)  Body mass index is 23.17 kg/m².    Intake/Output Summary (Last 24 hours) at 12/4/2023 1548  Last data filed at 12/4/2023 1209  Gross per 24 hour   Intake 600 ml   Output 1300 ml   Net -700 ml           Physical Exam  Vitals reviewed.   Cardiovascular:      Rate and Rhythm: Normal rate and regular rhythm.      Heart sounds: Normal heart sounds.   Pulmonary:      Effort: Pulmonary effort is normal.      Breath sounds: Normal breath sounds.   Abdominal:      General: Abdomen is flat. Bowel sounds are normal.      Palpations: Abdomen is soft.    Musculoskeletal:         General: Signs of injury present.      Comments: Wrapped left foot   Neurological:      Mental Status: He is alert.             Significant Labs: All pertinent labs within the past 24 hours have been reviewed.    Significant Imaging: I have reviewed all pertinent imaging results/findings within the past 24 hours.

## 2023-12-05 PROCEDURE — 25000003 PHARM REV CODE 250: Performed by: INTERNAL MEDICINE

## 2023-12-05 PROCEDURE — 99231 PR SUBSEQUENT HOSPITAL CARE,LEVL I: ICD-10-PCS | Mod: ,,, | Performed by: STUDENT IN AN ORGANIZED HEALTH CARE EDUCATION/TRAINING PROGRAM

## 2023-12-05 PROCEDURE — 99231 SBSQ HOSP IP/OBS SF/LOW 25: CPT | Mod: ,,, | Performed by: STUDENT IN AN ORGANIZED HEALTH CARE EDUCATION/TRAINING PROGRAM

## 2023-12-05 PROCEDURE — 11000001 HC ACUTE MED/SURG PRIVATE ROOM

## 2023-12-05 PROCEDURE — 93005 ELECTROCARDIOGRAM TRACING: CPT

## 2023-12-05 PROCEDURE — 25000003 PHARM REV CODE 250: Performed by: HOSPITALIST

## 2023-12-05 PROCEDURE — 93010 EKG 12-LEAD: ICD-10-PCS | Mod: ,,, | Performed by: STUDENT IN AN ORGANIZED HEALTH CARE EDUCATION/TRAINING PROGRAM

## 2023-12-05 PROCEDURE — 93010 ELECTROCARDIOGRAM REPORT: CPT | Mod: ,,, | Performed by: STUDENT IN AN ORGANIZED HEALTH CARE EDUCATION/TRAINING PROGRAM

## 2023-12-05 RX ADMIN — MIRTAZAPINE 7.5 MG: 7.5 TABLET, FILM COATED ORAL at 08:12

## 2023-12-05 RX ADMIN — DOCUSATE SODIUM 200 MG: 100 CAPSULE, LIQUID FILLED ORAL at 08:12

## 2023-12-05 RX ADMIN — MEMANTINE 10 MG: 5 TABLET ORAL at 09:12

## 2023-12-05 RX ADMIN — ACETAMINOPHEN 1000 MG: 500 TABLET, FILM COATED ORAL at 03:12

## 2023-12-05 RX ADMIN — POLYETHYLENE GLYCOL 3350 17 G: 17 POWDER, FOR SOLUTION ORAL at 09:12

## 2023-12-05 RX ADMIN — ASPIRIN 81 MG: 81 TABLET, COATED ORAL at 08:12

## 2023-12-05 RX ADMIN — MICONAZOLE NITRATE 2 % TOPICAL POWDER: at 08:12

## 2023-12-05 RX ADMIN — TICAGRELOR 90 MG: 90 TABLET ORAL at 08:12

## 2023-12-05 RX ADMIN — TICAGRELOR 90 MG: 90 TABLET ORAL at 09:12

## 2023-12-05 RX ADMIN — LISINOPRIL 10 MG: 10 TABLET ORAL at 09:12

## 2023-12-05 RX ADMIN — FERROUS SULFATE TAB 325 MG (65 MG ELEMENTAL FE) 1 EACH: 325 (65 FE) TAB at 08:12

## 2023-12-05 RX ADMIN — FERROUS SULFATE TAB 325 MG (65 MG ELEMENTAL FE) 1 EACH: 325 (65 FE) TAB at 09:12

## 2023-12-05 RX ADMIN — PANTOPRAZOLE SODIUM 20 MG: 20 TABLET, DELAYED RELEASE ORAL at 09:12

## 2023-12-05 RX ADMIN — ACETAMINOPHEN 1000 MG: 500 TABLET, FILM COATED ORAL at 08:12

## 2023-12-05 RX ADMIN — CYANOCOBALAMIN TAB 500 MCG 2000 MCG: 500 TAB at 09:12

## 2023-12-05 RX ADMIN — ACETAMINOPHEN 1000 MG: 500 TABLET, FILM COATED ORAL at 09:12

## 2023-12-05 RX ADMIN — MICONAZOLE NITRATE 2 % TOPICAL POWDER: at 09:12

## 2023-12-05 RX ADMIN — DOCUSATE SODIUM 200 MG: 100 CAPSULE, LIQUID FILLED ORAL at 09:12

## 2023-12-05 RX ADMIN — ATORVASTATIN CALCIUM 40 MG: 40 TABLET, FILM COATED ORAL at 08:12

## 2023-12-05 RX ADMIN — FAMOTIDINE 20 MG: 20 TABLET, FILM COATED ORAL at 09:12

## 2023-12-05 RX ADMIN — DONEPEZIL HYDROCHLORIDE 10 MG: 5 TABLET, FILM COATED ORAL at 08:12

## 2023-12-05 RX ADMIN — MEMANTINE 10 MG: 5 TABLET ORAL at 08:12

## 2023-12-05 NOTE — PROGRESS NOTES
[facility]  Adult Nutrition  First Assessment Note         Reason for Assessment  Reason For Assessment: length of stay   Nutrition Risk Screen: no indicators present    Assessment and Plan    Patient admitted on 11/28/23 and seen today for LOS. Patient admitted with Left great toe abscess and is now s/p L great toe amputation. Patient is ordered a mechanical soft diet with pureed meats. No documented chewing/swallowing issues with current diet per flowsheets. Intake previously 75%, however po intake decreased to 0% last several days. Unsure of reason for decreased po intake.     Last BM noted 12/3/23. Patient is currently 175# with a BMI of 23.09. Labs/Meds reviewed.     Recommend to add Boost Plus BID to add additional calories for healing and due to poor po intake of meals. RD will add. RD following.         Malnutrition  Is Patient Malnourished: No    Skin Integrity  Luigi Risk Assessment  Sensory Perception: 3-->slightly limited  Moisture: 3-->occasionally moist  Activity: 1-->bedfast  Mobility: 2-->very limited  Nutrition: 3-->adequate  Friction and Shear: 2-->potential problem  Luigi Score: 14  Comments on skin integrity: multiple areas of altered skin integrity    Nutrition Diagnosis  Altered nutrition related laboratory values related to Renal dysfunction as evidenced by abnormal renal labs  Comments:     Nutrition Risk  Level of Risk/Frequency of Follow-up: moderate  Comments on nutrition risk:      Recent Labs   Lab 12/04/23  0743   GLU 86     Comments on Glucose: wnl    Nutrition Prescription / Recommendations  Recommendation/Intervention: encourage po intake, continue diet as ordered, add boost plus bid  Goals: po intake %, weight maintenance  Nutrition Goal Status: new  Current Diet Order: dysphagia mechanical soft with pureed meats  Chewing or Swallowing Difficulty?: none documented  Recommended Diet:  mech soft with pureed meats  Recommended Oral Supplement: No  "Oral Supplements  Is Nutrition Support Recommended: [facility] Nutrition Support: No  Is Nutrition Education Recommended: No    Monitor and Evaluation  % current Intake: P.O. intake of 0 - 10%  % intake to meet estimated needs: 75 - 100 %  Food and Nutrient Intake: energy intake, food and beverage intake  Food and Nutrient Adminstration: diet order  Anthropometric Measurements: height/length, weight, weight change  Biochemical Data, Medical Tests and Procedures: electrolyte and renal panel, glucose/endocrine profile, lipid profile, inflammatory profile       Current Medical Diagnosis and Past Medical History     History reviewed. No pertinent past medical history.    Nutrition/Diet History  Spiritual, Cultural Beliefs, Congregation Practices, Values that Affect Care: no  Food Allergies: NKFA    Lab/Procedures/Meds  Recent Labs   Lab 12/04/23  0743      K 4.2   BUN 25*   CREATININE 1.55*   CALCIUM 8.8   *   Bun/Cr elevated no pmh CKD noted  Last A1c: No results found for: "HGBA1C"  Lab Results   Component Value Date    RBC 3.52 (L) 12/04/2023    HGB 9.7 (L) 12/04/2023    HCT 31.8 (L) 12/04/2023    MCV 90.3 12/04/2023    MCH 27.6 12/04/2023    MCHC 30.5 (L) 12/04/2023    TIBC 99 (L) 09/10/2020     Pertinent Labs Reviewed: reviewed  Pertinent Medications Reviewed: reviewed  Scheduled Meds:   acetaminophen  1,000 mg Oral TID    aspirin  81 mg Oral QHS    atorvastatin  40 mg Oral QHS    carvediloL  6.25 mg Oral Daily    cyanocobalamin  2,000 mcg Oral Daily    docusate sodium  200 mg Oral BID    donepeziL  10 mg Oral QHS    famotidine  20 mg Oral Daily    ferrous sulfate  1 tablet Oral BID    lisinopriL  10 mg Oral Daily    memantine  10 mg Oral BID    miconazole NITRATE 2 %   Topical (Top) BID    mirtazapine  7.5 mg Oral Nightly    pantoprazole  20 mg Oral Daily    polyethylene glycol  17 g Oral Daily    ticagrelor  90 mg Oral BID    vancomycin (VANCOCIN) IV (PEDS and ADULTS)  20 mg/kg Intravenous Q48H " "    Continuous Infusions:  PRN Meds:.acetaminophen, ondansetron, oxyCODONE, silver sulfADIAZINE 1%, vancomycin - pharmacy to dose      Anthropometrics  Temp: 97.2 °F (36.2 °C)  Height: 6' 1" (185.4 cm)  Height (inches): 73 in  Weight Method: Bed Scale  Weight: 79.4 kg (175 lb)  Weight (lb): 175 lb  Ideal Body Weight (IBW), Male: 184 lb  % Ideal Body Weight, Male (lb): 95.11 %  BMI (Calculated): 23.1  BMI Grade: 18.5-24.9 - normal       Estimated/Assessed Needs      Temp: 97.2 °F (36.2 °C)Oral  Weight Used For Calorie Calculations: 79.4 kg (175 lb)   Energy Need Method: Kcal/kg Energy Calorie Requirements (kcal): 1984-2381  Weight Used For Protein Calculations: 79.4 kg (175 lb)  Protein Requirements: 64-80  Estimated Fluid Requirement Method: RDA Method Fluid Requirements (mL): 1984  RDA Method (mL): 1984       Nutrition by Nursing  Diet/Nutrition Received: mechanical/dental soft  Intake (%): 0%  Diet/Feeding Assistance: assisted with feeding     Last Bowel Movement: 12/03/23                Nutrition Follow-Up  RD Follow-up?: Yes      , LD  Available via Secure Chat  "

## 2023-12-05 NOTE — PROGRESS NOTES
Pharmacy Consult    Consulted to assist in the management of Vanc therapy.  Patient is currently receiving Vanc 1500mg iv q48hr.  Trough drawn at 2030 on 12/4. Reported as 14.2 mcg/ml.  BUN/SCR = 25/1.55.  Since trough     Is in appropriate range, no changes needed at this time.  Will continue to monitor and make adjustment as necessary.      RALPH Ivy.Ph.

## 2023-12-05 NOTE — PROGRESS NOTES
[facility] Medicine  Progress Note    Patient Name: Riley Fox  MRN: [id number]  Patient Class: IP- Inpatient   Admission Date: 11/28/2023  Length of Stay: 7 days  Attending Physician: Chuy Washburn DO  Primary Care Provider: Paulette, Primary Doctor        Subjective:     Principal Problem:Abscess, toe, left        HPI:  Patient is a 83-year-old male with past medical history of hypertension, spinal stenosis, peripheral arterial disease, questionable dementia, chronic kidney disease, who presents to the emergency room for evaluation of left great toe osteomyelitis along with cellulitis of left lower extremity.  Patient is a long-term resident at the nursing home secondary to his spinal stenosis and resulting immobility, it appears as though he has some contractures of his lower extremity, his son is at bedside, he tells me that the patient has had some memory issues lately, he also tells me that he has history of CVA in the past.  Patient endorses pain in bilateral lower extremities, mostly on the affected side that is the left great toe, although the right lower extremity also appears to have some wounds.  General surgery evaluated the patient in the ER and recommended the patient to take to the OR for debridement and possible amputation of the affected toe, followed by admission to [facility] nurse for long-term wound care and IV antibiotics.  Patient otherwise is hemodynamically stable, denies any chest pain, denies any shortness of breath, denies any fevers chills nausea vomiting or diarrhea.    Overview/Hospital Course:  12/1- chart reviewed, continue vanco for 10 days  12/2-no complaints, doing well. Continue abx. Will return to [facility] at discharge  12/3-end date abx 12/11.  Daughter in room today, updated. No questions.    12/5-doing well, no issues    Interval History: naeo    Review of Systems   Constitutional: Negative.  Negative  for chills and fever.   HENT: Negative.     Eyes:  Negative for pain and redness.   Respiratory:  Negative for cough, chest tightness, shortness of breath and wheezing.    Cardiovascular:  Negative for chest pain and palpitations.   Gastrointestinal:  Negative for abdominal pain, diarrhea, nausea and vomiting.   Endocrine: Negative for cold intolerance, heat intolerance and polyuria.   Genitourinary:  Negative for difficulty urinating, dysuria, frequency and hematuria.   Musculoskeletal:  Negative for arthralgias, back pain, myalgias, neck pain and neck stiffness.   Skin:  Negative for pallor and rash.   Allergic/Immunologic: Negative.    Neurological:  Negative for dizziness, syncope, weakness, numbness and headaches.   Hematological:  Negative for adenopathy.   Psychiatric/Behavioral:  Negative for agitation, confusion and hallucinations. The patient is not nervous/anxious.      Objective:     Vital Signs (Most Recent):  Temp: 97.2 °F (36.2 °C) (12/05/23 0400)  Pulse: 70 (12/05/23 0400)  Resp: 14 (12/05/23 0400)  BP: (!) 131/57 (nurse aware) (12/05/23 0400)  SpO2: 99 % (12/05/23 0400) Vital Signs (24h Range):  Temp:  [97.2 °F (36.2 °C)-97.5 °F (36.4 °C)] 97.2 °F (36.2 °C)  Pulse:  [63-70] 70  Resp:  [12-14] 14  SpO2:  [96 %-99 %] 99 %  BP: (131-154)/(57-67) 131/57     Weight: 79.4 kg (175 lb)  Body mass index is 23.09 kg/m².    Intake/Output Summary (Last 24 hours) at 12/5/2023 1602  Last data filed at 12/5/2023 0400  Gross per 24 hour   Intake 490.05 ml   Output 300 ml   Net 190.05 ml           Physical Exam  Vitals reviewed.   Cardiovascular:      Rate and Rhythm: Normal rate and regular rhythm.      Heart sounds: Normal heart sounds.   Pulmonary:      Effort: Pulmonary effort is normal.      Breath sounds: Normal breath sounds.   Abdominal:      General: Abdomen is flat. Bowel sounds are normal.      Palpations: Abdomen is soft.   Musculoskeletal:         General: Signs of injury present.      Comments: Wrapped  left foot   Neurological:      Mental Status: He is alert.             Significant Labs: All pertinent labs within the past 24 hours have been reviewed.    Significant Imaging: I have reviewed all pertinent imaging results/findings within the past 24 hours.    Assessment/Plan:      * Abscess, toe, left  Pt seen in ER for surgical eval, gen surg recommend iv abx and OR  Pt to be admitted to [facility] post intervention  Cont iv abs  Wound care  Follow cx    11/29 - Now s/p amputation.  Continue abx.   11/30 - S/p L great toe amputation.  Continue abx for 1-2 weeks for soft tissue infection of the foot.   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  Stable    HTN (hypertension)  Cont home meds  Chronic controlled    PVD (peripheral vascular disease)  Cont DAPT  Statin  Monitor wound healing.       Cellulitis  Vanco, tentative end date 12/10        VTE Risk Mitigation (From admission, onward)      None            Discharge Planning   JORI:      Code Status: Full Code   Is the patient medically ready for discharge?:     Reason for patient still in hospital (select all that apply): Treatment  Discharge Plan A: Return to nursing home                  Chuy Washburn DO  Department of Hospital Medicine   [facility]

## 2023-12-05 NOTE — SUBJECTIVE & OBJECTIVE
Interval History: naeo    Review of Systems   Constitutional: Negative.  Negative for chills and fever.   HENT: Negative.     Eyes:  Negative for pain and redness.   Respiratory:  Negative for cough, chest tightness, shortness of breath and wheezing.    Cardiovascular:  Negative for chest pain and palpitations.   Gastrointestinal:  Negative for abdominal pain, diarrhea, nausea and vomiting.   Endocrine: Negative for cold intolerance, heat intolerance and polyuria.   Genitourinary:  Negative for difficulty urinating, dysuria, frequency and hematuria.   Musculoskeletal:  Negative for arthralgias, back pain, myalgias, neck pain and neck stiffness.   Skin:  Negative for pallor and rash.   Allergic/Immunologic: Negative.    Neurological:  Negative for dizziness, syncope, weakness, numbness and headaches.   Hematological:  Negative for adenopathy.   Psychiatric/Behavioral:  Negative for agitation, confusion and hallucinations. The patient is not nervous/anxious.      Objective:     Vital Signs (Most Recent):  Temp: 97.2 °F (36.2 °C) (12/05/23 0400)  Pulse: 70 (12/05/23 0400)  Resp: 14 (12/05/23 0400)  BP: (!) 131/57 (nurse aware) (12/05/23 0400)  SpO2: 99 % (12/05/23 0400) Vital Signs (24h Range):  Temp:  [97.2 °F (36.2 °C)-97.5 °F (36.4 °C)] 97.2 °F (36.2 °C)  Pulse:  [63-70] 70  Resp:  [12-14] 14  SpO2:  [96 %-99 %] 99 %  BP: (131-154)/(57-67) 131/57     Weight: 79.4 kg (175 lb)  Body mass index is 23.09 kg/m².    Intake/Output Summary (Last 24 hours) at 12/5/2023 1602  Last data filed at 12/5/2023 0400  Gross per 24 hour   Intake 490.05 ml   Output 300 ml   Net 190.05 ml           Physical Exam  Vitals reviewed.   Cardiovascular:      Rate and Rhythm: Normal rate and regular rhythm.      Heart sounds: Normal heart sounds.   Pulmonary:      Effort: Pulmonary effort is normal.      Breath sounds: Normal breath sounds.   Abdominal:      General: Abdomen is flat. Bowel sounds are normal.      Palpations: Abdomen is soft.    Musculoskeletal:         General: Signs of injury present.      Comments: Wrapped left foot   Neurological:      Mental Status: He is alert.             Significant Labs: All pertinent labs within the past 24 hours have been reviewed.    Significant Imaging: I have reviewed all pertinent imaging results/findings within the past 24 hours.

## 2023-12-06 LAB
ESTROGEN SERPL-MCNC: NORMAL PG/ML
INSULIN SERPL-ACNC: NORMAL U[IU]/ML
LAB AP GROSS DESCRIPTION: NORMAL
LAB AP LABORATORY NOTES: NORMAL
T3RU NFR SERPL: NORMAL %

## 2023-12-06 PROCEDURE — 25000003 PHARM REV CODE 250: Performed by: INTERNAL MEDICINE

## 2023-12-06 PROCEDURE — 99231 PR SUBSEQUENT HOSPITAL CARE,LEVL I: ICD-10-PCS | Mod: ,,, | Performed by: STUDENT IN AN ORGANIZED HEALTH CARE EDUCATION/TRAINING PROGRAM

## 2023-12-06 PROCEDURE — 25000003 PHARM REV CODE 250: Performed by: HOSPITALIST

## 2023-12-06 PROCEDURE — 99232 SBSQ HOSP IP/OBS MODERATE 35: CPT | Mod: ,,, | Performed by: NURSE PRACTITIONER

## 2023-12-06 PROCEDURE — 11000001 HC ACUTE MED/SURG PRIVATE ROOM

## 2023-12-06 PROCEDURE — 99231 SBSQ HOSP IP/OBS SF/LOW 25: CPT | Mod: ,,, | Performed by: STUDENT IN AN ORGANIZED HEALTH CARE EDUCATION/TRAINING PROGRAM

## 2023-12-06 PROCEDURE — 99232 PR SUBSEQUENT HOSPITAL CARE,LEVL II: ICD-10-PCS | Mod: ,,, | Performed by: NURSE PRACTITIONER

## 2023-12-06 RX ADMIN — CYANOCOBALAMIN TAB 500 MCG 2000 MCG: 500 TAB at 08:12

## 2023-12-06 RX ADMIN — CARVEDILOL 6.25 MG: 6.25 TABLET, FILM COATED ORAL at 08:12

## 2023-12-06 RX ADMIN — ACETAMINOPHEN 1000 MG: 500 TABLET, FILM COATED ORAL at 09:12

## 2023-12-06 RX ADMIN — MEMANTINE 10 MG: 5 TABLET ORAL at 09:12

## 2023-12-06 RX ADMIN — ATORVASTATIN CALCIUM 40 MG: 40 TABLET, FILM COATED ORAL at 09:12

## 2023-12-06 RX ADMIN — FERROUS SULFATE TAB 325 MG (65 MG ELEMENTAL FE) 1 EACH: 325 (65 FE) TAB at 08:12

## 2023-12-06 RX ADMIN — FAMOTIDINE 20 MG: 20 TABLET, FILM COATED ORAL at 08:12

## 2023-12-06 RX ADMIN — FERROUS SULFATE TAB 325 MG (65 MG ELEMENTAL FE) 1 EACH: 325 (65 FE) TAB at 09:12

## 2023-12-06 RX ADMIN — ACETAMINOPHEN 1000 MG: 500 TABLET, FILM COATED ORAL at 03:12

## 2023-12-06 RX ADMIN — TICAGRELOR 90 MG: 90 TABLET ORAL at 08:12

## 2023-12-06 RX ADMIN — DOCUSATE SODIUM 200 MG: 100 CAPSULE, LIQUID FILLED ORAL at 09:12

## 2023-12-06 RX ADMIN — MEMANTINE 10 MG: 5 TABLET ORAL at 08:12

## 2023-12-06 RX ADMIN — TICAGRELOR 90 MG: 90 TABLET ORAL at 09:12

## 2023-12-06 RX ADMIN — PANTOPRAZOLE SODIUM 20 MG: 20 TABLET, DELAYED RELEASE ORAL at 08:12

## 2023-12-06 RX ADMIN — ACETAMINOPHEN 1000 MG: 500 TABLET, FILM COATED ORAL at 08:12

## 2023-12-06 RX ADMIN — MICONAZOLE NITRATE 2 % TOPICAL POWDER: at 09:12

## 2023-12-06 RX ADMIN — MICONAZOLE NITRATE 2 % TOPICAL POWDER: at 08:12

## 2023-12-06 RX ADMIN — MIRTAZAPINE 7.5 MG: 7.5 TABLET, FILM COATED ORAL at 09:12

## 2023-12-06 RX ADMIN — DONEPEZIL HYDROCHLORIDE 10 MG: 5 TABLET, FILM COATED ORAL at 09:12

## 2023-12-06 RX ADMIN — DOCUSATE SODIUM 200 MG: 100 CAPSULE, LIQUID FILLED ORAL at 08:12

## 2023-12-06 RX ADMIN — LISINOPRIL 10 MG: 10 TABLET ORAL at 08:12

## 2023-12-06 RX ADMIN — ASPIRIN 81 MG: 81 TABLET, COATED ORAL at 09:12

## 2023-12-06 NOTE — PROGRESS NOTES
Pharmacokinetic Assessment Follow Up: IV Vancomycin    Vancomycin serum concentration assessment(s):    The trough level was drawn correctly and can be used to guide therapy at this time. The measurement is within the desired definitive target range of 10 to 20 mcg/mL.    Vancomycin Regimen Plan:    Continue regimen to Vancomycin 1500 mg IV every 48 hours with next serum trough concentration measured at 14.2 prior to 3rd dose on 12/4    Drug levels (last 3 results):  Recent Labs   Lab Result Units 12/04/23  2045   Vancomycin, Trough µg/mL 14.2       Pharmacy will continue to follow and monitor vancomycin.    Please contact pharmacy at extension [phone] for questions regarding this assessment.    Thank you for the consult,   Kimi Gonzalez       Patient brief summary:  Riley Fox is a 83 y.o. male initiated on antimicrobial therapy with IV Vancomycin for treatment of skin & soft tissue infection    The patient's current regimen is 1500 mg q 48 hours        Actual Body Weight:   78 kg    Renal Function:   Estimated Creatinine Clearance: 41.2 mL/min (A) (based on SCr of 1.55 mg/dL (H)).,         CBC (last 72 hours):  Recent Labs   Lab Result Units 12/04/23  0743   WBC K/uL 5.57   Hemoglobin g/dL 9.7*   Hematocrit % 31.8*   Platelet Count K/uL 249   Lymphocytes % % 14.7*   Monocytes % % 8.8*   Eosinophils % % 3.4   Basophils % % 0.7   Diff Type  Auto       Metabolic Panel (last 72 hours):  Recent Labs   Lab Result Units 12/04/23  0743   Sodium mmol/L 142   Potassium mmol/L 4.2   Chloride mmol/L 113*   CO2 mmol/L 23   Glucose mg/dL 86   BUN mg/dL 25*   Creatinine mg/dL 1.55*       Vancomycin Administrations:  vancomycin given in the last 96 hours                     vancomycin (VANCOCIN) 1,500 mg in dextrose 5 % (D5W) 250 mL IVPB (mg) 1,500 mg New Bag 12/04/23 2059     1,500 mg New Bag 12/02/23 2100                    Microbiologic Results:  Microbiology Results (last 7 days)       ** No results found for the  last 168 hours. **

## 2023-12-06 NOTE — PROGRESS NOTES
[facility] Hospital Medicine  Progress Note    Patient Name: Riley Fox  MRN: [id number]  Patient Class: IP- Inpatient   Admission Date: 11/28/2023  Length of Stay: 8 days  Attending Physician: Chuy Washburn DO  Primary Care Provider: Paulette, Primary Doctor        Subjective:     Principal Problem:Abscess, toe, left        HPI:  Patient is a 83-year-old male with past medical history of hypertension, spinal stenosis, peripheral arterial disease, questionable dementia, chronic kidney disease, who presents to the emergency room for evaluation of left great toe osteomyelitis along with cellulitis of left lower extremity.  Patient is a long-term resident at the nursing home secondary to his spinal stenosis and resulting immobility, it appears as though he has some contractures of his lower extremity, his son is at bedside, he tells me that the patient has had some memory issues lately, he also tells me that he has history of CVA in the past.  Patient endorses pain in bilateral lower extremities, mostly on the affected side that is the left great toe, although the right lower extremity also appears to have some wounds.  General surgery evaluated the patient in the ER and recommended the patient to take to the OR for debridement and possible amputation of the affected toe, followed by admission to [facility] nurse for long-term wound care and IV antibiotics.  Patient otherwise is hemodynamically stable, denies any chest pain, denies any shortness of breath, denies any fevers chills nausea vomiting or diarrhea.    Overview/Hospital Course:  12/1- chart reviewed, continue vanco for 10 days  12/2-no complaints, doing well. Continue abx. Will return to [facility] at discharge  12/3-end date abx 12/11.  Daughter in room today, updated. No questions.    12/5-doing well, no issues  12/6-no issues will complete abx in 5 days.     Interval History: naeo    Review of  Systems   Constitutional: Negative.  Negative for chills and fever.   HENT: Negative.     Eyes:  Negative for pain and redness.   Respiratory:  Negative for cough, chest tightness, shortness of breath and wheezing.    Cardiovascular:  Negative for chest pain and palpitations.   Gastrointestinal:  Negative for abdominal pain, diarrhea, nausea and vomiting.   Endocrine: Negative for cold intolerance, heat intolerance and polyuria.   Genitourinary:  Negative for difficulty urinating, dysuria, frequency and hematuria.   Musculoskeletal:  Negative for arthralgias, back pain, myalgias, neck pain and neck stiffness.   Skin:  Negative for pallor and rash.   Allergic/Immunologic: Negative.    Neurological:  Negative for dizziness, syncope, weakness, numbness and headaches.   Hematological:  Negative for adenopathy.   Psychiatric/Behavioral:  Negative for agitation, confusion and hallucinations. The patient is not nervous/anxious.      Objective:     Vital Signs (Most Recent):  Temp: 97.6 °F (36.4 °C) (12/06/23 0802)  Pulse: 72 (12/06/23 0802)  Resp: 18 (12/06/23 0802)  BP: (!) 164/96 (12/06/23 0802)  SpO2: 97 % (12/06/23 0802) Vital Signs (24h Range):  Temp:  [97 °F (36.1 °C)-97.9 °F (36.6 °C)] 97.6 °F (36.4 °C)  Pulse:  [56-87] 72  Resp:  [17-18] 18  SpO2:  [95 %-97 %] 97 %  BP: (123-164)/(42-96) 164/96     Weight: 78 kg (171 lb 15.3 oz)  Body mass index is 22.69 kg/m².    Intake/Output Summary (Last 24 hours) at 12/6/2023 0940  Last data filed at 12/6/2023 0815  Gross per 24 hour   Intake 480 ml   Output 650 ml   Net -170 ml           Physical Exam  Vitals reviewed.   Cardiovascular:      Rate and Rhythm: Normal rate and regular rhythm.      Heart sounds: Normal heart sounds.   Pulmonary:      Effort: Pulmonary effort is normal.      Breath sounds: Normal breath sounds.   Abdominal:      General: Abdomen is flat. Bowel sounds are normal.      Palpations: Abdomen is soft.   Musculoskeletal:         General: Signs of injury  present.      Comments: Wrapped left foot   Neurological:      Mental Status: He is alert.             Significant Labs: All pertinent labs within the past 24 hours have been reviewed.    Significant Imaging: I have reviewed all pertinent imaging results/findings within the past 24 hours.    Assessment/Plan:      * Abscess, toe, left  Pt seen in ER for surgical eval, gen surg recommend iv abx and OR  Pt to be admitted to [facility] post intervention  Cont iv abs  Wound care  Follow cx    11/29 - Now s/p amputation.  Continue abx.   11/30 - S/p L great toe amputation.  Continue abx for 1-2 weeks for soft tissue infection of the foot.   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  Stable    HTN (hypertension)  Cont home meds  Chronic controlled    PVD (peripheral vascular disease)  Cont DAPT  Statin  Monitor wound healing.       Cellulitis  Vanco, tentative end date 12/10        VTE Risk Mitigation (From admission, onward)      None            Discharge Planning   JORI:      Code Status: Full Code   Is the patient medically ready for discharge?:     Reason for patient still in hospital (select all that apply): Treatment  Discharge Plan A: Return to nursing home                  Chuy Washburn DO  Department of Hospital Medicine   [facility]

## 2023-12-06 NOTE — SUBJECTIVE & OBJECTIVE
Interval History: naeo    Review of Systems   Constitutional: Negative.  Negative for chills and fever.   HENT: Negative.     Eyes:  Negative for pain and redness.   Respiratory:  Negative for cough, chest tightness, shortness of breath and wheezing.    Cardiovascular:  Negative for chest pain and palpitations.   Gastrointestinal:  Negative for abdominal pain, diarrhea, nausea and vomiting.   Endocrine: Negative for cold intolerance, heat intolerance and polyuria.   Genitourinary:  Negative for difficulty urinating, dysuria, frequency and hematuria.   Musculoskeletal:  Negative for arthralgias, back pain, myalgias, neck pain and neck stiffness.   Skin:  Negative for pallor and rash.   Allergic/Immunologic: Negative.    Neurological:  Negative for dizziness, syncope, weakness, numbness and headaches.   Hematological:  Negative for adenopathy.   Psychiatric/Behavioral:  Negative for agitation, confusion and hallucinations. The patient is not nervous/anxious.      Objective:     Vital Signs (Most Recent):  Temp: 97.6 °F (36.4 °C) (12/06/23 0802)  Pulse: 72 (12/06/23 0802)  Resp: 18 (12/06/23 0802)  BP: (!) 164/96 (12/06/23 0802)  SpO2: 97 % (12/06/23 0802) Vital Signs (24h Range):  Temp:  [97 °F (36.1 °C)-97.9 °F (36.6 °C)] 97.6 °F (36.4 °C)  Pulse:  [56-87] 72  Resp:  [17-18] 18  SpO2:  [95 %-97 %] 97 %  BP: (123-164)/(42-96) 164/96     Weight: 78 kg (171 lb 15.3 oz)  Body mass index is 22.69 kg/m².    Intake/Output Summary (Last 24 hours) at 12/6/2023 0940  Last data filed at 12/6/2023 0815  Gross per 24 hour   Intake 480 ml   Output 650 ml   Net -170 ml           Physical Exam  Vitals reviewed.   Cardiovascular:      Rate and Rhythm: Normal rate and regular rhythm.      Heart sounds: Normal heart sounds.   Pulmonary:      Effort: Pulmonary effort is normal.      Breath sounds: Normal breath sounds.   Abdominal:      General: Abdomen is flat. Bowel sounds are normal.      Palpations: Abdomen is soft.    Musculoskeletal:         General: Signs of injury present.      Comments: Wrapped left foot   Neurological:      Mental Status: He is alert.             Significant Labs: All pertinent labs within the past 24 hours have been reviewed.    Significant Imaging: I have reviewed all pertinent imaging results/findings within the past 24 hours.

## 2023-12-06 NOTE — SUBJECTIVE & OBJECTIVE
Subjective:     HPI:  84 yo male with surgical wound to left great toe amputation site, right great toe and heel, and incontinence dermatitis to sacral/perineum. He is status post debridement of right great toe per Dr. Butcher 11/28. Retention sutures intact. Granulation tissue to wound bed. Right great toe with slough and eschar. Significant PMH includes  hypertension, PVD, CKD, and hypertension. Wound healing is complicated by multiple co-morbidities, poor vascular supply, edema, necrosis, tract(s), advanced age, fragile skin, no protective sensation, and infection.          Hospital Course:   11/29/2023 - Wound care consulted to evaluate and follow wounds . POC established today. Barriers to wound healing identified and preventive measures in place  12/6/2023 - Right great toe has improved. Continue with silvadene. Incision site is healing well. Continue optifoam ag. He has SDTI to left lateral foot, have been using foam borders with no improvement. D/c foam borders and pain with betadine and apply dry dressing.           Scheduled Meds:   acetaminophen  1,000 mg Oral TID    aspirin  81 mg Oral QHS    atorvastatin  40 mg Oral QHS    carvediloL  6.25 mg Oral Daily    cyanocobalamin  2,000 mcg Oral Daily    docusate sodium  200 mg Oral BID    donepeziL  10 mg Oral QHS    famotidine  20 mg Oral Daily    ferrous sulfate  1 tablet Oral BID    lisinopriL  10 mg Oral Daily    memantine  10 mg Oral BID    miconazole NITRATE 2 %   Topical (Top) BID    mirtazapine  7.5 mg Oral Nightly    pantoprazole  20 mg Oral Daily    polyethylene glycol  17 g Oral Daily    ticagrelor  90 mg Oral BID    vancomycin (VANCOCIN) IV (PEDS and ADULTS)  20 mg/kg Intravenous Q48H     Continuous Infusions:  PRN Meds:acetaminophen, ondansetron, oxyCODONE, silver sulfADIAZINE 1%, vancomycin - pharmacy to dose    Review of Systems   Unable to perform ROS: Dementia     Objective:     Vital Signs (Most Recent):  Temp: 97.6 °F (36.4 °C) (12/06/23  0802)  Pulse: 72 (12/06/23 0802)  Resp: 18 (12/06/23 0802)  BP: (!) 164/96 (12/06/23 0802)  SpO2: 97 % (12/06/23 0802) Vital Signs (24h Range):  Temp:  [97 °F (36.1 °C)-97.9 °F (36.6 °C)] 97.6 °F (36.4 °C)  Pulse:  [56-87] 72  Resp:  [17-18] 18  SpO2:  [95 %-97 %] 97 %  BP: (123-164)/(42-96) 164/96     Weight: 78 kg (171 lb 15.3 oz)  Body mass index is 22.69 kg/m².     Physical Exam  Vitals reviewed.   Constitutional:       Appearance: He is ill-appearing.   HENT:      Head: Normocephalic.   Cardiovascular:      Rate and Rhythm: Normal rate and regular rhythm.      Pulses: Normal pulses.      Heart sounds: Normal heart sounds.   Pulmonary:      Effort: Pulmonary effort is normal.      Breath sounds: Normal breath sounds.   Musculoskeletal:         General: Tenderness and deformity present.      Right lower leg: Edema present.      Left lower leg: Edema present.   Skin:     Capillary Refill: Capillary refill takes more than 3 seconds.      Findings: Erythema and rash present.      Comments: Wound, see LDA for photos/measurements   Neurological:      Mental Status: He is alert. Mental status is at baseline.      Motor: Weakness present.      Gait: Gait abnormal.

## 2023-12-06 NOTE — PROGRESS NOTES
[facility]  Wound Care and Hyperbarics REGINO  Progress Note    Patient Name: Riley Fox  MRN: [id number]  Admission Date: 11/28/2023  Hospital Length of Stay: 8 days  Attending Physician: Chuy Washburn DO  Primary Care Provider: Paulette, Primary Doctor         Subjective:     HPI:  84 yo male with surgical wound to left great toe amputation site, right great toe and heel, and incontinence dermatitis to sacral/perineum. He is status post debridement of right great toe per Dr. Butcher 11/28. Retention sutures intact. Granulation tissue to wound bed. Right great toe with slough and eschar. Significant PMH includes  hypertension, PVD, CKD, and hypertension. Wound healing is complicated by multiple co-morbidities, poor vascular supply, edema, necrosis, tract(s), advanced age, fragile skin, no protective sensation, and infection.          Hospital Course:   11/29/2023 - Wound care consulted to evaluate and follow wounds . POC established today. Barriers to wound healing identified and preventive measures in place  12/6/2023 - Right great toe has improved. Continue with silvadene. Incision site is healing well. Continue optifoam ag. He has SDTI to left lateral foot, have been using foam borders with no improvement. D/c foam borders and pain with betadine and apply dry dressing.           Scheduled Meds:   acetaminophen  1,000 mg Oral TID    aspirin  81 mg Oral QHS    atorvastatin  40 mg Oral QHS    carvediloL  6.25 mg Oral Daily    cyanocobalamin  2,000 mcg Oral Daily    docusate sodium  200 mg Oral BID    donepeziL  10 mg Oral QHS    famotidine  20 mg Oral Daily    ferrous sulfate  1 tablet Oral BID    lisinopriL  10 mg Oral Daily    memantine  10 mg Oral BID    miconazole NITRATE 2 %   Topical (Top) BID    mirtazapine  7.5 mg Oral Nightly    pantoprazole  20 mg Oral Daily    polyethylene glycol  17 g Oral Daily    ticagrelor  90 mg Oral BID    vancomycin (VANCOCIN) IV (PEDS and ADULTS)  20  mg/kg Intravenous Q48H     Continuous Infusions:  PRN Meds:acetaminophen, ondansetron, oxyCODONE, silver sulfADIAZINE 1%, vancomycin - pharmacy to dose    Review of Systems   Unable to perform ROS: Dementia     Objective:     Vital Signs (Most Recent):  Temp: 97.6 °F (36.4 °C) (12/06/23 0802)  Pulse: 72 (12/06/23 0802)  Resp: 18 (12/06/23 0802)  BP: (!) 164/96 (12/06/23 0802)  SpO2: 97 % (12/06/23 0802) Vital Signs (24h Range):  Temp:  [97 °F (36.1 °C)-97.9 °F (36.6 °C)] 97.6 °F (36.4 °C)  Pulse:  [56-87] 72  Resp:  [17-18] 18  SpO2:  [95 %-97 %] 97 %  BP: (123-164)/(42-96) 164/96     Weight: 78 kg (171 lb 15.3 oz)  Body mass index is 22.69 kg/m².     Physical Exam  Vitals reviewed.   Constitutional:       Appearance: He is ill-appearing.   HENT:      Head: Normocephalic.   Cardiovascular:      Rate and Rhythm: Normal rate and regular rhythm.      Pulses: Normal pulses.      Heart sounds: Normal heart sounds.   Pulmonary:      Effort: Pulmonary effort is normal.      Breath sounds: Normal breath sounds.   Musculoskeletal:         General: Tenderness and deformity present.      Right lower leg: Edema present.      Left lower leg: Edema present.   Skin:     Capillary Refill: Capillary refill takes more than 3 seconds.      Findings: Erythema and rash present.      Comments: Wound, see LDA for photos/measurements   Neurological:      Mental Status: He is alert. Mental status is at baseline.      Motor: Weakness present.      Gait: Gait abnormal.              Assessment/Plan:     Orthopedic  Abrasion of right heel  Clean with vashe  Pat dry   Cover with aquacel ag foam border  Change M, W, F and PRN for soilage    Open wound of left great toe  Clean with vashe  Pat dry  Apply optifoam ag   Cover with 4x4s, josie, and paper tape  Ace wrap to prevent break through bleeding  Elevate on pillows when in bed  Waffle boots    Non-pressure chronic ulcer of other part of right foot with fat layer exposed  Clean with vashe  Pat  dry  Apply silvadene to left great toe  Cover and secure with 4x4s, josie, and paper tape  Change daily and PRN  Elevate legs when sitting or in bed  Waffle boots when in bed        NICOLE Padron  Wound Care and Hyperbarics REGINO  [facility]

## 2023-12-07 PROCEDURE — 11000001 HC ACUTE MED/SURG PRIVATE ROOM

## 2023-12-07 PROCEDURE — 99231 PR SUBSEQUENT HOSPITAL CARE,LEVL I: ICD-10-PCS | Mod: ,,, | Performed by: STUDENT IN AN ORGANIZED HEALTH CARE EDUCATION/TRAINING PROGRAM

## 2023-12-07 PROCEDURE — 25000003 PHARM REV CODE 250: Performed by: STUDENT IN AN ORGANIZED HEALTH CARE EDUCATION/TRAINING PROGRAM

## 2023-12-07 PROCEDURE — 99231 SBSQ HOSP IP/OBS SF/LOW 25: CPT | Mod: ,,, | Performed by: STUDENT IN AN ORGANIZED HEALTH CARE EDUCATION/TRAINING PROGRAM

## 2023-12-07 PROCEDURE — 63600175 PHARM REV CODE 636 W HCPCS: Performed by: STUDENT IN AN ORGANIZED HEALTH CARE EDUCATION/TRAINING PROGRAM

## 2023-12-07 PROCEDURE — 25000003 PHARM REV CODE 250: Performed by: INTERNAL MEDICINE

## 2023-12-07 PROCEDURE — 25000003 PHARM REV CODE 250: Performed by: HOSPITALIST

## 2023-12-07 RX ADMIN — TICAGRELOR 90 MG: 90 TABLET ORAL at 08:12

## 2023-12-07 RX ADMIN — DOCUSATE SODIUM 200 MG: 100 CAPSULE, LIQUID FILLED ORAL at 09:12

## 2023-12-07 RX ADMIN — FAMOTIDINE 20 MG: 20 TABLET, FILM COATED ORAL at 09:12

## 2023-12-07 RX ADMIN — FERROUS SULFATE TAB 325 MG (65 MG ELEMENTAL FE) 1 EACH: 325 (65 FE) TAB at 08:12

## 2023-12-07 RX ADMIN — ACETAMINOPHEN 1000 MG: 500 TABLET, FILM COATED ORAL at 03:12

## 2023-12-07 RX ADMIN — MIRTAZAPINE 7.5 MG: 7.5 TABLET, FILM COATED ORAL at 08:12

## 2023-12-07 RX ADMIN — FERROUS SULFATE TAB 325 MG (65 MG ELEMENTAL FE) 1 EACH: 325 (65 FE) TAB at 09:12

## 2023-12-07 RX ADMIN — MICONAZOLE NITRATE 2 % TOPICAL POWDER: at 08:12

## 2023-12-07 RX ADMIN — ATORVASTATIN CALCIUM 40 MG: 40 TABLET, FILM COATED ORAL at 08:12

## 2023-12-07 RX ADMIN — VANCOMYCIN HYDROCHLORIDE 1500 MG: 1 INJECTION, POWDER, LYOPHILIZED, FOR SOLUTION INTRAVENOUS at 01:12

## 2023-12-07 RX ADMIN — MEMANTINE 10 MG: 5 TABLET ORAL at 08:12

## 2023-12-07 RX ADMIN — CYANOCOBALAMIN TAB 500 MCG 2000 MCG: 500 TAB at 09:12

## 2023-12-07 RX ADMIN — ACETAMINOPHEN 1000 MG: 500 TABLET, FILM COATED ORAL at 09:12

## 2023-12-07 RX ADMIN — DONEPEZIL HYDROCHLORIDE 10 MG: 5 TABLET, FILM COATED ORAL at 08:12

## 2023-12-07 RX ADMIN — ASPIRIN 81 MG: 81 TABLET, COATED ORAL at 08:12

## 2023-12-07 RX ADMIN — DOCUSATE SODIUM 200 MG: 100 CAPSULE, LIQUID FILLED ORAL at 08:12

## 2023-12-07 RX ADMIN — MEMANTINE 10 MG: 5 TABLET ORAL at 09:12

## 2023-12-07 RX ADMIN — PANTOPRAZOLE SODIUM 20 MG: 20 TABLET, DELAYED RELEASE ORAL at 09:12

## 2023-12-07 RX ADMIN — TICAGRELOR 90 MG: 90 TABLET ORAL at 09:12

## 2023-12-07 RX ADMIN — ACETAMINOPHEN 1000 MG: 500 TABLET, FILM COATED ORAL at 08:12

## 2023-12-07 RX ADMIN — MICONAZOLE NITRATE 2 % TOPICAL POWDER: at 09:12

## 2023-12-07 NOTE — PLAN OF CARE
"[facility]   Interdisciplinary Team Meeting    Patient: Riley Fox   Today's Date: 12/7/2023   Estimated D/C Date:         Physician:  Alirio ZALDIVAR Unit Director:  ABDIRAHMAN   Pharmacy: Sonya Gonzalez PharmD Case Management:  Anuradha JUNG   Physical/Occupational Therapy: Thea Milton OT Speech Therapy: Thea Milton OT   Respiratory: Nieves Choudhary, RT Nursing: Erika Ordonez RN   Wound Care: Kathleen Wen LPN   Utilization Management: Ángel Mcdowell RN     Nursing  New Symptoms/Problems: none  Bowel: incontinent Urine: incontinent [place]: No   Constipated: No    Lab Concerns: none  Lab Reviewed: Yes  New Lab Orders: No    Nutrition:  dysphagia mec soft  Intake percentage: 45%   Weight: Weight: 78 kg (171 lb 15.3 oz)   Height: Height: 6' 1" (185.4 cm)  BMI: BMI (Calculated): 22.7    Speech/Swallowing: No current speech or swallowing issues  Aspiration Precautions: No  Cognition: WNL Diarrhea: No      Respiratory Therapy  Isolation: Yes  Last Bowel Movement: 12/06/23     O2 Device: Room Air  Vent: No  Comment(s): na   O2 Flow: ra  SpO2: 97 %       Physical Therapy  Physical Therapy/Gait: na ELOS: Plan to DC     Transfers:  na  Bed Mobility:  na Range of Motion/Restrictions: na  Comment(s): na     Occupational Therapy  Eating/Grooming:  na Toileting:  na   Bathing:  na Dressing (Upper Body):  na   Dressing (Lower Body):  na      Wound Care: betadine right lateral foot left great toe amputation accuform MWF  left foot      Tx Plan/Recommendations reviewed with family and/or patient on (date) 12/5/23.  Additional family Conference/Training: na  D/C Plan/Recommendations: Discharge to SNF  JORI:     Pharmacy  Medication Changes (see MD orders in chart): No  Pharmacy comments: vancomycin every 48 hours  IV Medications: na               "

## 2023-12-07 NOTE — SUBJECTIVE & OBJECTIVE
Interval History: naeo    Review of Systems   Constitutional: Negative.  Negative for chills and fever.   HENT: Negative.     Eyes:  Negative for pain and redness.   Respiratory:  Negative for cough, chest tightness, shortness of breath and wheezing.    Cardiovascular:  Negative for chest pain and palpitations.   Gastrointestinal:  Negative for abdominal pain, diarrhea, nausea and vomiting.   Endocrine: Negative for cold intolerance, heat intolerance and polyuria.   Genitourinary:  Negative for difficulty urinating, dysuria, frequency and hematuria.   Musculoskeletal:  Negative for arthralgias, back pain, myalgias, neck pain and neck stiffness.   Skin:  Negative for pallor and rash.   Allergic/Immunologic: Negative.    Neurological:  Negative for dizziness, syncope, weakness, numbness and headaches.   Hematological:  Negative for adenopathy.   Psychiatric/Behavioral:  Negative for agitation, confusion and hallucinations. The patient is not nervous/anxious.      Objective:     Vital Signs (Most Recent):  Temp: 97.5 °F (36.4 °C) (12/07/23 0800)  Pulse: 79 (12/07/23 0800)  Resp: 20 (12/07/23 0800)  BP: (!) 114/54 (12/07/23 0800)  SpO2: 97 % (12/07/23 0800) Vital Signs (24h Range):  Temp:  [97.5 °F (36.4 °C)-97.9 °F (36.6 °C)] 97.5 °F (36.4 °C)  Pulse:  [62-92] 79  Resp:  [16-20] 20  SpO2:  [95 %-97 %] 97 %  BP: (114-148)/(54-85) 114/54     Weight: 78 kg (171 lb 15.3 oz)  Body mass index is 22.69 kg/m².    Intake/Output Summary (Last 24 hours) at 12/7/2023 1044  Last data filed at 12/7/2023 0918  Gross per 24 hour   Intake 1720 ml   Output --   Net 1720 ml           Physical Exam  Vitals reviewed.   Cardiovascular:      Rate and Rhythm: Normal rate and regular rhythm.      Heart sounds: Normal heart sounds.   Pulmonary:      Effort: Pulmonary effort is normal.      Breath sounds: Normal breath sounds.   Abdominal:      General: Abdomen is flat. Bowel sounds are normal.      Palpations: Abdomen is soft.    Musculoskeletal:         General: Signs of injury present.      Comments: Wrapped left foot   Neurological:      Mental Status: He is alert.             Significant Labs: All pertinent labs within the past 24 hours have been reviewed.    Significant Imaging: I have reviewed all pertinent imaging results/findings within the past 24 hours.

## 2023-12-08 LAB
ANION GAP SERPL CALCULATED.3IONS-SCNC: 10 MMOL/L (ref 7–16)
BASOPHILS # BLD AUTO: 0.05 K/UL (ref 0–0.2)
BASOPHILS NFR BLD AUTO: 0.9 % (ref 0–1)
BUN SERPL-MCNC: 28 MG/DL (ref 7–18)
BUN/CREAT SERPL: 21 (ref 6–20)
CALCIUM SERPL-MCNC: 8.4 MG/DL (ref 8.5–10.1)
CHLORIDE SERPL-SCNC: 108 MMOL/L (ref 98–107)
CO2 SERPL-SCNC: 26 MMOL/L (ref 21–32)
CREAT SERPL-MCNC: 1.32 MG/DL (ref 0.7–1.3)
DIFFERENTIAL METHOD BLD: ABNORMAL
EGFR (NO RACE VARIABLE) (RUSH/TITUS): 54 ML/MIN/1.73M2
EOSINOPHIL # BLD AUTO: 0.25 K/UL (ref 0–0.5)
EOSINOPHIL NFR BLD AUTO: 4.5 % (ref 1–4)
ERYTHROCYTE [DISTWIDTH] IN BLOOD BY AUTOMATED COUNT: 16.6 % (ref 11.5–14.5)
GLUCOSE SERPL-MCNC: 90 MG/DL (ref 74–106)
HCT VFR BLD AUTO: 33.1 % (ref 40–54)
HGB BLD-MCNC: 10 G/DL (ref 13.5–18)
IMM GRANULOCYTES # BLD AUTO: 0.06 K/UL (ref 0–0.04)
IMM GRANULOCYTES NFR BLD: 1.1 % (ref 0–0.4)
LYMPHOCYTES # BLD AUTO: 0.96 K/UL (ref 1–4.8)
LYMPHOCYTES NFR BLD AUTO: 17.2 % (ref 27–41)
MCH RBC QN AUTO: 27.7 PG (ref 27–31)
MCHC RBC AUTO-ENTMCNC: 30.2 G/DL (ref 32–36)
MCV RBC AUTO: 91.7 FL (ref 80–96)
MONOCYTES # BLD AUTO: 0.53 K/UL (ref 0–0.8)
MONOCYTES NFR BLD AUTO: 9.5 % (ref 2–6)
MPC BLD CALC-MCNC: 10.1 FL (ref 9.4–12.4)
NEUTROPHILS # BLD AUTO: 3.74 K/UL (ref 1.8–7.7)
NEUTROPHILS NFR BLD AUTO: 66.8 % (ref 53–65)
NRBC # BLD AUTO: 0 X10E3/UL
NRBC, AUTO (.00): 0 %
PLATELET # BLD AUTO: 195 K/UL (ref 150–400)
POTASSIUM SERPL-SCNC: 4.3 MMOL/L (ref 3.5–5.1)
RBC # BLD AUTO: 3.61 M/UL (ref 4.6–6.2)
SODIUM SERPL-SCNC: 140 MMOL/L (ref 136–145)
WBC # BLD AUTO: 5.59 K/UL (ref 4.5–11)

## 2023-12-08 PROCEDURE — 25000003 PHARM REV CODE 250: Performed by: HOSPITALIST

## 2023-12-08 PROCEDURE — 85025 COMPLETE CBC W/AUTO DIFF WBC: CPT | Performed by: STUDENT IN AN ORGANIZED HEALTH CARE EDUCATION/TRAINING PROGRAM

## 2023-12-08 PROCEDURE — 80048 BASIC METABOLIC PNL TOTAL CA: CPT | Performed by: STUDENT IN AN ORGANIZED HEALTH CARE EDUCATION/TRAINING PROGRAM

## 2023-12-08 PROCEDURE — 25000003 PHARM REV CODE 250: Performed by: STUDENT IN AN ORGANIZED HEALTH CARE EDUCATION/TRAINING PROGRAM

## 2023-12-08 PROCEDURE — 99231 PR SUBSEQUENT HOSPITAL CARE,LEVL I: ICD-10-PCS | Mod: ,,, | Performed by: STUDENT IN AN ORGANIZED HEALTH CARE EDUCATION/TRAINING PROGRAM

## 2023-12-08 PROCEDURE — 25000003 PHARM REV CODE 250: Performed by: INTERNAL MEDICINE

## 2023-12-08 PROCEDURE — 11000001 HC ACUTE MED/SURG PRIVATE ROOM

## 2023-12-08 PROCEDURE — 99231 SBSQ HOSP IP/OBS SF/LOW 25: CPT | Mod: ,,, | Performed by: STUDENT IN AN ORGANIZED HEALTH CARE EDUCATION/TRAINING PROGRAM

## 2023-12-08 RX ADMIN — FAMOTIDINE 20 MG: 20 TABLET, FILM COATED ORAL at 09:12

## 2023-12-08 RX ADMIN — FERROUS SULFATE TAB 325 MG (65 MG ELEMENTAL FE) 1 EACH: 325 (65 FE) TAB at 08:12

## 2023-12-08 RX ADMIN — FERROUS SULFATE TAB 325 MG (65 MG ELEMENTAL FE) 1 EACH: 325 (65 FE) TAB at 09:12

## 2023-12-08 RX ADMIN — ACETAMINOPHEN 1000 MG: 500 TABLET, FILM COATED ORAL at 08:12

## 2023-12-08 RX ADMIN — TICAGRELOR 90 MG: 90 TABLET ORAL at 09:12

## 2023-12-08 RX ADMIN — SODIUM CHLORIDE 500 ML: 9 INJECTION, SOLUTION INTRAVENOUS at 12:12

## 2023-12-08 RX ADMIN — DONEPEZIL HYDROCHLORIDE 10 MG: 5 TABLET, FILM COATED ORAL at 08:12

## 2023-12-08 RX ADMIN — MEMANTINE 10 MG: 5 TABLET ORAL at 08:12

## 2023-12-08 RX ADMIN — ACETAMINOPHEN 1000 MG: 500 TABLET, FILM COATED ORAL at 09:12

## 2023-12-08 RX ADMIN — DOCUSATE SODIUM 200 MG: 100 CAPSULE, LIQUID FILLED ORAL at 09:12

## 2023-12-08 RX ADMIN — MIRTAZAPINE 7.5 MG: 7.5 TABLET, FILM COATED ORAL at 08:12

## 2023-12-08 RX ADMIN — ATORVASTATIN CALCIUM 40 MG: 40 TABLET, FILM COATED ORAL at 08:12

## 2023-12-08 RX ADMIN — MICONAZOLE NITRATE 2 % TOPICAL POWDER: at 09:12

## 2023-12-08 RX ADMIN — ASPIRIN 81 MG: 81 TABLET, COATED ORAL at 08:12

## 2023-12-08 RX ADMIN — DOCUSATE SODIUM 200 MG: 100 CAPSULE, LIQUID FILLED ORAL at 08:12

## 2023-12-08 RX ADMIN — TICAGRELOR 90 MG: 90 TABLET ORAL at 08:12

## 2023-12-08 RX ADMIN — MEMANTINE 10 MG: 5 TABLET ORAL at 09:12

## 2023-12-08 RX ADMIN — POLYETHYLENE GLYCOL 3350 17 G: 17 POWDER, FOR SOLUTION ORAL at 09:12

## 2023-12-08 RX ADMIN — PANTOPRAZOLE SODIUM 20 MG: 20 TABLET, DELAYED RELEASE ORAL at 09:12

## 2023-12-08 RX ADMIN — ACETAMINOPHEN 1000 MG: 500 TABLET, FILM COATED ORAL at 03:12

## 2023-12-08 RX ADMIN — CYANOCOBALAMIN TAB 500 MCG 2000 MCG: 500 TAB at 09:12

## 2023-12-08 NOTE — PROGRESS NOTES
[facility]  Progress Note    Patient Name: Riley Fox  MRN: [id number]  Patient Class: IP- Inpatient   Admission Date: 11/28/2023  Length of Stay: 10 days  Attending Physician: Chuy Washburn DO  Primary Care Provider: Paulette, Primary Doctor        Subjective:     Principal Problem:Abscess, toe, left        HPI:  Patient is a 83-year-old male with past medical history of hypertension, spinal stenosis, peripheral arterial disease, questionable dementia, chronic kidney disease, who presents to the emergency room for evaluation of left great toe osteomyelitis along with cellulitis of left lower extremity.  Patient is a long-term resident at the nursing home secondary to his spinal stenosis and resulting immobility, it appears as though he has some contractures of his lower extremity, his son is at bedside, he tells me that the patient has had some memory issues lately, he also tells me that he has history of CVA in the past.  Patient endorses pain in bilateral lower extremities, mostly on the affected side that is the left great toe, although the right lower extremity also appears to have some wounds.  General surgery evaluated the patient in the ER and recommended the patient to take to the OR for debridement and possible amputation of the affected toe, followed by admission to [facility] for long-term wound care and IV antibiotics.  Patient otherwise is hemodynamically stable, denies any chest pain, denies any shortness of breath, denies any fevers chills nausea vomiting or diarrhea.    Overview/Hospital Course:  12/1- chart reviewed, continue vanco for 10 days  12/2-no complaints, doing well. Continue abx. Will return to [facility] at discharge  12/3-end date abx 12/11.  Daughter in room today, updated. No questions.    12/5-doing well, no issues  12/6-no issues will complete abx in 5 days.   12/8-doing well, looks a little  dehydrated. Will give small bolus    Interval History: naeo    Review of Systems   Constitutional: Negative.  Negative for chills and fever.   HENT: Negative.     Eyes:  Negative for pain and redness.   Respiratory:  Negative for cough, chest tightness, shortness of breath and wheezing.    Cardiovascular:  Negative for chest pain and palpitations.   Gastrointestinal:  Negative for abdominal pain, diarrhea, nausea and vomiting.   Endocrine: Negative for cold intolerance, heat intolerance and polyuria.   Genitourinary:  Negative for difficulty urinating, dysuria, frequency and hematuria.   Musculoskeletal:  Negative for arthralgias, back pain, myalgias, neck pain and neck stiffness.   Skin:  Negative for pallor and rash.   Allergic/Immunologic: Negative.    Neurological:  Negative for dizziness, syncope, weakness, numbness and headaches.   Hematological:  Negative for adenopathy.   Psychiatric/Behavioral:  Negative for agitation, confusion and hallucinations. The patient is not nervous/anxious.      Objective:     Vital Signs (Most Recent):  Temp: 96.5 °F (35.8 °C) (12/08/23 0800)  Pulse: 62 (12/08/23 0800)  Resp: 16 (12/08/23 0800)  BP: (!) 117/49 (12/08/23 0800)  SpO2: 96 % (12/08/23 0800) Vital Signs (24h Range):  Temp:  [96.5 °F (35.8 °C)-97.9 °F (36.6 °C)] 96.5 °F (35.8 °C)  Pulse:  [62-72] 62  Resp:  [16-18] 16  SpO2:  [96 %-97 %] 96 %  BP: (109-120)/(49-62) 117/49     Weight: 78 kg (171 lb 15.3 oz)  Body mass index is 22.69 kg/m².    Intake/Output Summary (Last 24 hours) at 12/8/2023 1125  Last data filed at 12/8/2023 0941  Gross per 24 hour   Intake 360 ml   Output 900 ml   Net -540 ml           Physical Exam  Vitals reviewed.   Cardiovascular:      Rate and Rhythm: Normal rate and regular rhythm.      Heart sounds: Normal heart sounds.   Pulmonary:      Effort: Pulmonary effort is normal.      Breath sounds: Normal breath sounds.   Abdominal:      General: Abdomen is flat. Bowel sounds are normal.       Palpations: Abdomen is soft.   Musculoskeletal:         General: Signs of injury present.      Comments: Wrapped left foot   Neurological:      Mental Status: He is alert.             Significant Labs: All pertinent labs within the past 24 hours have been reviewed.    Significant Imaging: I have reviewed all pertinent imaging results/findings within the past 24 hours.    Assessment/Plan:      * Abscess, toe, left  Pt seen in ER for surgical eval, gen surg recommend iv abx and OR  Pt to be admitted to [facility] post intervention  Cont iv abs  Wound care  Follow cx    11/29 - Now s/p amputation.  Continue abx.   11/30 - S/p L great toe amputation.  Continue abx for 1-2 weeks for soft tissue infection of the foot.   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  Stable    HTN (hypertension)  Cont home meds  Chronic controlled    PVD (peripheral vascular disease)  Cont DAPT  Statin  Monitor wound healing.       Cellulitis  Vanco, tentative end date 12/10        VTE Risk Mitigation (From admission, onward)      None            Discharge Planning   JORI:      Code Status: Full Code   Is the patient medically ready for discharge?:     Reason for patient still in hospital (select all that apply): Treatment  Discharge Plan A: Return to nursing home                  Chuy Washburn DO  Department of Hospital Medicine   [facility]

## 2023-12-08 NOTE — PROGRESS NOTES
[facility]  Adult Nutrition  First Assessment Note         Reason for Assessment  Reason For Assessment: RD follow-up   Nutrition Risk Screen: no indicators present    Assessment and Plan    12/8/2023: RD followup. Patient remains on mechanical soft diet with puree meats + Boost Plus TID and tolerating well. Intake varies. Mostly 50-75% average. Recommend continue current diet as tolerated. Encourage good po intakes. Current weight 171 pounds with a BMI of 22.69. Last BM 12/8 per flowsheet. RD following.     12/5/2023: Patient admitted on 11/28/23 and seen today for LOS. Patient admitted with Left great toe abscess and is now s/p L great toe amputation. Patient is ordered a mechanical soft diet with pureed meats. No documented chewing/swallowing issues with current diet per flowsheets. Intake previously 75%, however po intake decreased to 0% last several days. Unsure of reason for decreased po intake.     Last BM noted 12/3/23. Patient is currently 175# with a BMI of 23.09. Labs/Meds reviewed.     Recommend to add Boost Plus BID to add additional calories for healing and due to poor po intake of meals. RD will add. RD following.         Learning Needs/Social Determinants of Health  Learning Assessment       11/28/2023 1807 [facility] (11/28/2023 - Present)   Created by Emily Miramontes, RN - RN (Nurse) Status: Complete                 PRIMARY LEARNER     Primary Learner Name:  Riley Fox MB - 11/28/2023 1807    Relationship:  Family MB - 11/28/2023 1807    Does the primary learner have any barriers to learning?:  No Barriers MB - 11/28/2023 1807    What is the preferred language of the primary learner?:  English MB - 11/28/2023 1807    Is an  required?:  No MB - 11/28/2023 1807    How does the primary learner prefer to learn new concepts?:  Listening, Demonstration MB - 11/28/2023 1807    How often do you need to have someone help you  read instructions, pamphlets, or written material from your doctor or pharmacy?:  Sometimes MB - 11/28/2023 1807        CO-LEARNER #1     Co-Learner Name (if applicable):  Riley Fox MB - 11/28/2023 1807    Relationship:  Patient MB - 11/28/2023 1807    Does the co-learner have any barriers to learning?:  No Barriers MB - 11/28/2023 1807    What is the preferred language of the co-learner?:  English MB - 11/28/2023 1807    Is an  required?:  No MB - 11/28/2023 1807    How does the co-learner prefer to learn new concepts?:  Listening, Demonstration MB - 11/28/2023 1807        CO-LEARNER #2     No question answered        SPECIAL TOPICS     No question answered        ANSWERED BY:     No question answered        Emily Patel RN - RN (Nurse)   11/28/2023 1807                           Social Determinants of Health     Tobacco Use: Not on file   Alcohol Use: Not At Risk (11/28/2023)    AUDIT-C     Frequency of Alcohol Consumption: Never     Average Number of Drinks: Patient does not drink     Frequency of Binge Drinking: Never   Financial Resource Strain: Low Risk  (11/28/2023)    Overall Financial Resource Strain (CARDIA)     Difficulty of Paying Living Expenses: Not hard at all   Food Insecurity: No Food Insecurity (11/28/2023)    Hunger Vital Sign     Worried About Running Out of Food in the Last Year: Never true     Ran Out of Food in the Last Year: Never true   Transportation Needs: No Transportation Needs (11/28/2023)    PRAPARE - Transportation     Lack of Transportation (Medical): No     Lack of Transportation (Non-Medical): No   Physical Activity: Inactive (11/28/2023)    Exercise Vital Sign     Days of Exercise per Week: 0 days     Minutes of Exercise per Session: 0 min   Stress: No Stress Concern Present (11/28/2023)    [employer] [place] [employer] - Occupational Stress Questionnaire     Feeling of Stress : Not at all   Social Connections: Socially  Isolated (11/28/2023)    Social Connection and Isolation Panel [NHANES]     Frequency of Communication with Friends and Family: Never     Frequency of Social Gatherings with Friends and Family: Twice a week     Attends [employer] Services: Never     Active Member of Clubs or Organizations: Not on file     Attends Club or Organization Meetings: Never     Marital Status:    Housing Stability: Low Risk  (11/28/2023)    Housing Stability Vital Sign     Unable to Pay for Housing in the Last Year: No     Number of Places Lived in the Last Year: 1     Unstable Housing in the Last Year: No   Depression: Not on file          Malnutrition  Is Patient Malnourished: No    Skin Integrity  Luigi Risk Assessment  Sensory Perception: 3-->slightly limited  Moisture: 3-->occasionally moist  Activity: 1-->bedfast  Mobility: 2-->very limited  Nutrition: 2-->probably inadequate  Friction and Shear: 2-->potential problem  Luigi Score: 13  Comments on skin integrity: multiple areas of altered skin integrity    Nutrition Diagnosis  Altered nutrition related laboratory values related to Renal dysfunction as evidenced by abnormal renal labs  Comments:     Nutrition Risk  Level of Risk/Frequency of Follow-up: moderate  Comments on nutrition risk:      Recent Labs   Lab 12/08/23  0603   GLU 90     Comments on Glucose: wnl    Nutrition Prescription / Recommendations  Recommendation/Intervention: encourage po intake, continue diet as ordered, add boost plus bid  Goals: po intake %, weight maintenance  Nutrition Goal Status: new  Current Diet Order: dysphagia mechanical soft with pureed meats  Chewing or Swallowing Difficulty?: none documented  Recommended Diet: Mechanical Soft with ground meats  Recommended Oral Supplement: Boost Plus [360kcals, 14g Protein, 45g Carbs] 2 times a day  Is Nutrition Support Recommended: [facility] Nutrition Support: No  Is Nutrition Education Recommended: No    Monitor and Evaluation  % current  "Intake: P.O. intake of 50 - 75 %  % intake to meet estimated needs: 75 - 100 %  Food and Nutrient Intake: energy intake, food and beverage intake  Food and Nutrient Adminstration: diet order  Anthropometric Measurements: height/length, weight, weight change  Biochemical Data, Medical Tests and Procedures: electrolyte and renal panel, glucose/endocrine profile, lipid profile, inflammatory profile       Current Medical Diagnosis and Past Medical History     History reviewed. No pertinent past medical history.    Nutrition/Diet History  Spiritual, Cultural Beliefs, Druze Practices, Values that Affect Care: no  Food Allergies: NKFA    Lab/Procedures/Meds  Recent Labs   Lab 12/08/23  0603      K 4.3   BUN 28*   CREATININE 1.32*   CALCIUM 8.4*   *   Bun/Cr elevated no pmh CKD noted  Last A1c: No results found for: "HGBA1C"  Lab Results   Component Value Date    RBC 3.61 (L) 12/08/2023    HGB 10.0 (L) 12/08/2023    HCT 33.1 (L) 12/08/2023    MCV 91.7 12/08/2023    MCH 27.7 12/08/2023    MCHC 30.2 (L) 12/08/2023    TIBC 99 (L) 09/10/2020     Pertinent Labs Reviewed: reviewed  Pertinent Medications Reviewed: reviewed  Scheduled Meds:   acetaminophen  1,000 mg Oral TID    aspirin  81 mg Oral QHS    atorvastatin  40 mg Oral QHS    carvediloL  6.25 mg Oral Daily    cyanocobalamin  2,000 mcg Oral Daily    docusate sodium  200 mg Oral BID    donepeziL  10 mg Oral QHS    famotidine  20 mg Oral Daily    ferrous sulfate  1 tablet Oral BID    lisinopriL  10 mg Oral Daily    memantine  10 mg Oral BID    miconazole NITRATE 2 %   Topical (Top) BID    mirtazapine  7.5 mg Oral Nightly    pantoprazole  20 mg Oral Daily    polyethylene glycol  17 g Oral Daily    ticagrelor  90 mg Oral BID    vancomycin (VANCOCIN) IV (PEDS and ADULTS)  20 mg/kg Intravenous Q48H     Continuous Infusions:  PRN Meds:.acetaminophen, ondansetron, oxyCODONE, silver sulfADIAZINE 1%, vancomycin - pharmacy to dose      Anthropometrics  Temp: 96.5 °F " "(35.8 °C)  Height: 6' 1" (185.4 cm)  Height (inches): 73 in  Weight Method: Bed Scale  Weight: 78 kg (171 lb 15.3 oz)  Weight (lb): 171.96 lb  Ideal Body Weight (IBW), Male: 184 lb  % Ideal Body Weight, Male (lb): 95.11 %  BMI (Calculated): 22.7  BMI Grade: 18.5-24.9 - normal       Estimated/Assessed Needs      Temp: 96.5 °F (35.8 °C)Oral  Weight Used For Calorie Calculations: 79.4 kg (175 lb)   Energy Need Method: Kcal/kg Energy Calorie Requirements (kcal): 1984-2381  Weight Used For Protein Calculations: 79.4 kg (175 lb)  Protein Requirements: 64-80  Estimated Fluid Requirement Method: RDA Method Fluid Requirements (mL): 1984  RDA Method (mL): 1984       Nutrition by Nursing  Diet/Nutrition Received: mechanical/dental soft  Intake (%): 50%  Diet/Feeding Assistance: assisted with feeding  Diet/Feeding Tolerance: refused  Last Bowel Movement: 12/08/23                Nutrition Follow-Up  RD Follow-up?: Yes    Nutrition Discharge Planning  Current plan is to discharge to SNF. Patient remains on abx, anticipated end date 12/11. Will reassess closer to discharge.       Elvira Batres, MS, RD, LD  Available via Secure Chat  "

## 2023-12-08 NOTE — SUBJECTIVE & OBJECTIVE
Interval History: naeo    Review of Systems   Constitutional: Negative.  Negative for chills and fever.   HENT: Negative.     Eyes:  Negative for pain and redness.   Respiratory:  Negative for cough, chest tightness, shortness of breath and wheezing.    Cardiovascular:  Negative for chest pain and palpitations.   Gastrointestinal:  Negative for abdominal pain, diarrhea, nausea and vomiting.   Endocrine: Negative for cold intolerance, heat intolerance and polyuria.   Genitourinary:  Negative for difficulty urinating, dysuria, frequency and hematuria.   Musculoskeletal:  Negative for arthralgias, back pain, myalgias, neck pain and neck stiffness.   Skin:  Negative for pallor and rash.   Allergic/Immunologic: Negative.    Neurological:  Negative for dizziness, syncope, weakness, numbness and headaches.   Hematological:  Negative for adenopathy.   Psychiatric/Behavioral:  Negative for agitation, confusion and hallucinations. The patient is not nervous/anxious.      Objective:     Vital Signs (Most Recent):  Temp: 96.5 °F (35.8 °C) (12/08/23 0800)  Pulse: 62 (12/08/23 0800)  Resp: 16 (12/08/23 0800)  BP: (!) 117/49 (12/08/23 0800)  SpO2: 96 % (12/08/23 0800) Vital Signs (24h Range):  Temp:  [96.5 °F (35.8 °C)-97.9 °F (36.6 °C)] 96.5 °F (35.8 °C)  Pulse:  [62-72] 62  Resp:  [16-18] 16  SpO2:  [96 %-97 %] 96 %  BP: (109-120)/(49-62) 117/49     Weight: 78 kg (171 lb 15.3 oz)  Body mass index is 22.69 kg/m².    Intake/Output Summary (Last 24 hours) at 12/8/2023 1125  Last data filed at 12/8/2023 0941  Gross per 24 hour   Intake 360 ml   Output 900 ml   Net -540 ml           Physical Exam  Vitals reviewed.   Cardiovascular:      Rate and Rhythm: Normal rate and regular rhythm.      Heart sounds: Normal heart sounds.   Pulmonary:      Effort: Pulmonary effort is normal.      Breath sounds: Normal breath sounds.   Abdominal:      General: Abdomen is flat. Bowel sounds are normal.      Palpations: Abdomen is soft.    Musculoskeletal:         General: Signs of injury present.      Comments: Wrapped left foot   Neurological:      Mental Status: He is alert.             Significant Labs: All pertinent labs within the past 24 hours have been reviewed.    Significant Imaging: I have reviewed all pertinent imaging results/findings within the past 24 hours.

## 2023-12-09 PROCEDURE — 11000001 HC ACUTE MED/SURG PRIVATE ROOM

## 2023-12-09 PROCEDURE — 25000003 PHARM REV CODE 250: Performed by: INTERNAL MEDICINE

## 2023-12-09 PROCEDURE — 99231 PR SUBSEQUENT HOSPITAL CARE,LEVL I: ICD-10-PCS | Mod: ,,, | Performed by: STUDENT IN AN ORGANIZED HEALTH CARE EDUCATION/TRAINING PROGRAM

## 2023-12-09 PROCEDURE — 25000003 PHARM REV CODE 250: Performed by: HOSPITALIST

## 2023-12-09 PROCEDURE — 63600175 PHARM REV CODE 636 W HCPCS: Performed by: STUDENT IN AN ORGANIZED HEALTH CARE EDUCATION/TRAINING PROGRAM

## 2023-12-09 PROCEDURE — 99231 SBSQ HOSP IP/OBS SF/LOW 25: CPT | Mod: ,,, | Performed by: STUDENT IN AN ORGANIZED HEALTH CARE EDUCATION/TRAINING PROGRAM

## 2023-12-09 PROCEDURE — 25000003 PHARM REV CODE 250: Performed by: STUDENT IN AN ORGANIZED HEALTH CARE EDUCATION/TRAINING PROGRAM

## 2023-12-09 RX ADMIN — PANTOPRAZOLE SODIUM 20 MG: 20 TABLET, DELAYED RELEASE ORAL at 09:12

## 2023-12-09 RX ADMIN — ACETAMINOPHEN 1000 MG: 500 TABLET, FILM COATED ORAL at 09:12

## 2023-12-09 RX ADMIN — ATORVASTATIN CALCIUM 40 MG: 40 TABLET, FILM COATED ORAL at 09:12

## 2023-12-09 RX ADMIN — MICONAZOLE NITRATE 2 % TOPICAL POWDER: at 09:12

## 2023-12-09 RX ADMIN — FERROUS SULFATE TAB 325 MG (65 MG ELEMENTAL FE) 1 EACH: 325 (65 FE) TAB at 09:12

## 2023-12-09 RX ADMIN — ACETAMINOPHEN 1000 MG: 500 TABLET, FILM COATED ORAL at 02:12

## 2023-12-09 RX ADMIN — FAMOTIDINE 20 MG: 20 TABLET, FILM COATED ORAL at 09:12

## 2023-12-09 RX ADMIN — TICAGRELOR 90 MG: 90 TABLET ORAL at 09:12

## 2023-12-09 RX ADMIN — MEMANTINE 10 MG: 5 TABLET ORAL at 09:12

## 2023-12-09 RX ADMIN — DOCUSATE SODIUM 200 MG: 100 CAPSULE, LIQUID FILLED ORAL at 09:12

## 2023-12-09 RX ADMIN — LISINOPRIL 10 MG: 10 TABLET ORAL at 09:12

## 2023-12-09 RX ADMIN — MIRTAZAPINE 7.5 MG: 7.5 TABLET, FILM COATED ORAL at 09:12

## 2023-12-09 RX ADMIN — VANCOMYCIN HYDROCHLORIDE 1500 MG: 1 INJECTION, POWDER, LYOPHILIZED, FOR SOLUTION INTRAVENOUS at 01:12

## 2023-12-09 RX ADMIN — ASPIRIN 81 MG: 81 TABLET, COATED ORAL at 09:12

## 2023-12-09 RX ADMIN — DONEPEZIL HYDROCHLORIDE 10 MG: 5 TABLET, FILM COATED ORAL at 09:12

## 2023-12-09 RX ADMIN — CYANOCOBALAMIN TAB 500 MCG 2000 MCG: 500 TAB at 09:12

## 2023-12-09 NOTE — PROGRESS NOTES
[facility] Medicine  Progress Note    Patient Name: Riley Fox  MRN: [id number]  Patient Class: IP- Inpatient   Admission Date: 11/28/2023  Length of Stay: 11 days  Attending Physician: Chuy Washburn DO  Primary Care Provider: Paulette, Primary Doctor        Subjective:     Principal Problem:Abscess, toe, left        HPI:  Patient is a 83-year-old male with past medical history of hypertension, spinal stenosis, peripheral arterial disease, questionable dementia, chronic kidney disease, who presents to the emergency room for evaluation of left great toe osteomyelitis along with cellulitis of left lower extremity.  Patient is a long-term resident at the nursing home secondary to his spinal stenosis and resulting immobility, it appears as though he has some contractures of his lower extremity, his son is at bedside, he tells me that the patient has had some memory issues lately, he also tells me that he has history of CVA in the past.  Patient endorses pain in bilateral lower extremities, mostly on the affected side that is the left great toe, although the right lower extremity also appears to have some wounds.  General surgery evaluated the patient in the ER and recommended the patient to take to the OR for debridement and possible amputation of the affected toe, followed by admission to [facility] nurse for long-term wound care and IV antibiotics.  Patient otherwise is hemodynamically stable, denies any chest pain, denies any shortness of breath, denies any fevers chills nausea vomiting or diarrhea.    Overview/Hospital Course:  12/1- chart reviewed, continue vanco for 10 days  12/2-no complaints, doing well. Continue abx. Will return to [facility] at discharge  12/3-end date abx 12/11.  Daughter in room today, updated. No questions.    12/5-doing well, no issues  12/6-no issues will complete abx in 5 days.   12/8-doing well, looks a little  dehydrated. Will give small bolus  12/9-No complaints    Interval History: naeo    Review of Systems   Constitutional: Negative.  Negative for chills and fever.   HENT: Negative.     Eyes:  Negative for pain and redness.   Respiratory:  Negative for cough, chest tightness, shortness of breath and wheezing.    Cardiovascular:  Negative for chest pain and palpitations.   Gastrointestinal:  Negative for abdominal pain, diarrhea, nausea and vomiting.   Endocrine: Negative for cold intolerance, heat intolerance and polyuria.   Genitourinary:  Negative for difficulty urinating, dysuria, frequency and hematuria.   Musculoskeletal:  Negative for arthralgias, back pain, myalgias, neck pain and neck stiffness.   Skin:  Negative for pallor and rash.   Allergic/Immunologic: Negative.    Neurological:  Negative for dizziness, syncope, weakness, numbness and headaches.   Hematological:  Negative for adenopathy.   Psychiatric/Behavioral:  Negative for agitation, confusion and hallucinations. The patient is not nervous/anxious.      Objective:     Vital Signs (Most Recent):  Temp: 97.8 °F (36.6 °C) (12/09/23 0800)  Pulse: 78 (12/09/23 0800)  Resp: 16 (12/09/23 0800)  BP: (!) 142/64 (nurse aware) (12/09/23 0800)  SpO2: 97 % (12/09/23 0800) Vital Signs (24h Range):  Temp:  [96.2 °F (35.7 °C)-97.8 °F (36.6 °C)] 97.8 °F (36.6 °C)  Pulse:  [56-78] 78  Resp:  [12-16] 16  SpO2:  [95 %-97 %] 97 %  BP: (129-153)/(45-64) 142/64     Weight: 78 kg (171 lb 15.3 oz)  Body mass index is 22.69 kg/m².    Intake/Output Summary (Last 24 hours) at 12/9/2023 1428  Last data filed at 12/9/2023 1245  Gross per 24 hour   Intake 957 ml   Output 1225 ml   Net -268 ml           Physical Exam  Vitals reviewed.   Cardiovascular:      Rate and Rhythm: Normal rate and regular rhythm.      Heart sounds: Normal heart sounds.   Pulmonary:      Effort: Pulmonary effort is normal.      Breath sounds: Normal breath sounds.   Abdominal:      General: Abdomen is flat.  Bowel sounds are normal.      Palpations: Abdomen is soft.   Musculoskeletal:         General: Signs of injury present.      Comments: Wrapped left foot   Neurological:      Mental Status: He is alert.             Significant Labs: All pertinent labs within the past 24 hours have been reviewed.    Significant Imaging: I have reviewed all pertinent imaging results/findings within the past 24 hours.    Assessment/Plan:      * Abscess, toe, left  Pt seen in ER for surgical eval, gen surg recommend iv abx and OR  Pt to be [facility] to [facility] post intervention  Cont iv abs  Wound care  Follow cx    11/29 - Now s/p amputation.  Continue abx.   11/30 - S/p L great toe amputation.  Continue abx for 1-2 weeks for soft tissue infection of the foot.   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  Stable    HTN (hypertension)  Cont home meds  Chronic controlled    PVD (peripheral vascular disease)  Cont DAPT  Statin  Monitor wound healing.       Cellulitis  Vanco, tentative end date 12/10        VTE Risk Mitigation (From admission, onward)      None            Discharge Planning   JORI:      Code Status: Full Code   Is the patient medically ready for discharge?:     Reason for patient still in hospital (select all that apply): Treatment  Discharge Plan A: Return to nursing home                  Chuy Washburn DO  Department of Hospital Medicine   [facility]

## 2023-12-09 NOTE — SUBJECTIVE & OBJECTIVE
Interval History: naeo    Review of Systems   Constitutional: Negative.  Negative for chills and fever.   HENT: Negative.     Eyes:  Negative for pain and redness.   Respiratory:  Negative for cough, chest tightness, shortness of breath and wheezing.    Cardiovascular:  Negative for chest pain and palpitations.   Gastrointestinal:  Negative for abdominal pain, diarrhea, nausea and vomiting.   Endocrine: Negative for cold intolerance, heat intolerance and polyuria.   Genitourinary:  Negative for difficulty urinating, dysuria, frequency and hematuria.   Musculoskeletal:  Negative for arthralgias, back pain, myalgias, neck pain and neck stiffness.   Skin:  Negative for pallor and rash.   Allergic/Immunologic: Negative.    Neurological:  Negative for dizziness, syncope, weakness, numbness and headaches.   Hematological:  Negative for adenopathy.   Psychiatric/Behavioral:  Negative for agitation, confusion and hallucinations. The patient is not nervous/anxious.      Objective:     Vital Signs (Most Recent):  Temp: 97.8 °F (36.6 °C) (12/09/23 0800)  Pulse: 78 (12/09/23 0800)  Resp: 16 (12/09/23 0800)  BP: (!) 142/64 (nurse aware) (12/09/23 0800)  SpO2: 97 % (12/09/23 0800) Vital Signs (24h Range):  Temp:  [96.2 °F (35.7 °C)-97.8 °F (36.6 °C)] 97.8 °F (36.6 °C)  Pulse:  [56-78] 78  Resp:  [12-16] 16  SpO2:  [95 %-97 %] 97 %  BP: (129-153)/(45-64) 142/64     Weight: 78 kg (171 lb 15.3 oz)  Body mass index is 22.69 kg/m².    Intake/Output Summary (Last 24 hours) at 12/9/2023 1428  Last data filed at 12/9/2023 1245  Gross per 24 hour   Intake 957 ml   Output 1225 ml   Net -268 ml           Physical Exam  Vitals reviewed.   Cardiovascular:      Rate and Rhythm: Normal rate and regular rhythm.      Heart sounds: Normal heart sounds.   Pulmonary:      Effort: Pulmonary effort is normal.      Breath sounds: Normal breath sounds.   Abdominal:      General: Abdomen is flat. Bowel sounds are normal.      Palpations: Abdomen is soft.    Musculoskeletal:         General: Signs of injury present.      Comments: Wrapped left foot   Neurological:      Mental Status: He is alert.             Significant Labs: All pertinent labs within the past 24 hours have been reviewed.    Significant Imaging: I have reviewed all pertinent imaging results/findings within the past 24 hours.

## 2023-12-10 PROCEDURE — 25000003 PHARM REV CODE 250: Performed by: HOSPITALIST

## 2023-12-10 PROCEDURE — 11000001 HC ACUTE MED/SURG PRIVATE ROOM

## 2023-12-10 PROCEDURE — 25000003 PHARM REV CODE 250: Performed by: INTERNAL MEDICINE

## 2023-12-10 PROCEDURE — 99231 SBSQ HOSP IP/OBS SF/LOW 25: CPT | Mod: ,,, | Performed by: STUDENT IN AN ORGANIZED HEALTH CARE EDUCATION/TRAINING PROGRAM

## 2023-12-10 PROCEDURE — 99231 PR SUBSEQUENT HOSPITAL CARE,LEVL I: ICD-10-PCS | Mod: ,,, | Performed by: STUDENT IN AN ORGANIZED HEALTH CARE EDUCATION/TRAINING PROGRAM

## 2023-12-10 RX ADMIN — ACETAMINOPHEN 1000 MG: 500 TABLET, FILM COATED ORAL at 03:12

## 2023-12-10 RX ADMIN — TICAGRELOR 90 MG: 90 TABLET ORAL at 08:12

## 2023-12-10 RX ADMIN — MEMANTINE 10 MG: 5 TABLET ORAL at 08:12

## 2023-12-10 RX ADMIN — FERROUS SULFATE TAB 325 MG (65 MG ELEMENTAL FE) 1 EACH: 325 (65 FE) TAB at 09:12

## 2023-12-10 RX ADMIN — MEMANTINE 10 MG: 5 TABLET ORAL at 09:12

## 2023-12-10 RX ADMIN — MICONAZOLE NITRATE 2 % TOPICAL POWDER: at 09:12

## 2023-12-10 RX ADMIN — DONEPEZIL HYDROCHLORIDE 10 MG: 5 TABLET, FILM COATED ORAL at 09:12

## 2023-12-10 RX ADMIN — DOCUSATE SODIUM 200 MG: 100 CAPSULE, LIQUID FILLED ORAL at 08:12

## 2023-12-10 RX ADMIN — MIRTAZAPINE 7.5 MG: 7.5 TABLET, FILM COATED ORAL at 09:12

## 2023-12-10 RX ADMIN — FERROUS SULFATE TAB 325 MG (65 MG ELEMENTAL FE) 1 EACH: 325 (65 FE) TAB at 08:12

## 2023-12-10 RX ADMIN — CYANOCOBALAMIN TAB 500 MCG 2000 MCG: 500 TAB at 08:12

## 2023-12-10 RX ADMIN — TICAGRELOR 90 MG: 90 TABLET ORAL at 09:12

## 2023-12-10 RX ADMIN — ACETAMINOPHEN 1000 MG: 500 TABLET, FILM COATED ORAL at 08:12

## 2023-12-10 RX ADMIN — DOCUSATE SODIUM 200 MG: 100 CAPSULE, LIQUID FILLED ORAL at 09:12

## 2023-12-10 RX ADMIN — CARVEDILOL 6.25 MG: 6.25 TABLET, FILM COATED ORAL at 08:12

## 2023-12-10 RX ADMIN — ATORVASTATIN CALCIUM 40 MG: 40 TABLET, FILM COATED ORAL at 09:12

## 2023-12-10 RX ADMIN — MICONAZOLE NITRATE 2 % TOPICAL POWDER: at 08:12

## 2023-12-10 RX ADMIN — ACETAMINOPHEN 1000 MG: 500 TABLET, FILM COATED ORAL at 09:12

## 2023-12-10 RX ADMIN — FAMOTIDINE 20 MG: 20 TABLET, FILM COATED ORAL at 08:12

## 2023-12-10 RX ADMIN — PANTOPRAZOLE SODIUM 20 MG: 20 TABLET, DELAYED RELEASE ORAL at 08:12

## 2023-12-10 RX ADMIN — ASPIRIN 81 MG: 81 TABLET, COATED ORAL at 09:12

## 2023-12-10 NOTE — PLAN OF CARE
Problem: Adult Inpatient Plan of Care  Goal: Plan of Care Review  Outcome: Ongoing, Progressing  Goal: Patient-Specific Goal (Individualized)  Outcome: Ongoing, Progressing  Goal: Absence of Hospital-Acquired Illness or Injury  Outcome: Ongoing, Progressing  Goal: Optimal Comfort and Wellbeing  Outcome: Ongoing, Progressing     Problem: Skin Injury Risk Increased  Goal: Skin Health and Integrity  Outcome: Ongoing, Progressing     Problem: Infection  Goal: Absence of Infection Signs and Symptoms  Outcome: Ongoing, Progressing     Problem: Impaired Wound Healing  Goal: Optimal Wound Healing  Outcome: Ongoing, Progressing     Problem: Pain Acute  Goal: Acceptable Pain Control and Functional Ability  Outcome: Ongoing, Progressing

## 2023-12-10 NOTE — NURSING
12/9/2023 2010 Pt lying in bed resting. No noted distress. Skin warm to the touch. Respiration even and unlabored. Dressing to bilateral great toe and dressing to left foot on bilateral side intact and secure. Right midline intact and secure with no iv complications. Condom cath intact and secure with yellow urine. Safety measures ongoing.    12/10/2023 0300 Lying in bed resting. No noted distress. Small report given to ERICH Jimenes

## 2023-12-10 NOTE — PROGRESS NOTES
[facility] Medicine  Progress Note    Patient Name: Riley Fox  MRN: [id number]  Patient Class: IP- Inpatient   Admission Date: 11/28/2023  Length of Stay: 12 days  Attending Physician: Chuy Washburn DO  Primary Care Provider: Paulette, Primary Doctor        Subjective:     Principal Problem:Abscess, toe, left        HPI:  Patient is a 83-year-old male with past medical history of hypertension, spinal stenosis, peripheral arterial disease, questionable dementia, chronic kidney disease, who presents to the emergency room for evaluation of left great toe osteomyelitis along with cellulitis of left lower extremity.  Patient is a long-term resident at the nursing home secondary to his spinal stenosis and resulting immobility, it appears as though he has some contractures of his lower extremity, his son is at bedside, he tells me that the patient has had some memory issues lately, he also tells me that he has history of CVA in the past.  Patient endorses pain in bilateral lower extremities, mostly on the affected side that is the left great toe, although the right lower extremity also appears to have some wounds.  General surgery evaluated the patient in the ER and recommended the patient to take to the OR for debridement and possible amputation of the affected toe, followed by admission to [facility] nurse for long-term wound care and IV antibiotics.  Patient otherwise is hemodynamically stable, denies any chest pain, denies any shortness of breath, denies any fevers chills nausea vomiting or diarrhea.    Overview/Hospital Course:  12/1- chart reviewed, continue vanco for 10 days  12/2-no complaints, doing well. Continue abx. Will return to [facility] at discharge  12/3-end date abx 12/11.  Daughter in room today, updated. No questions.    12/5-doing well, no issues  12/6-no issues will complete abx in 5 days.   12/8-doing well, looks a little  dehydrated. Will give small bolus  12/9-No complaints  12/10-home tomorrow    Interval History: naeo    Review of Systems   Constitutional: Negative.  Negative for chills and fever.   HENT: Negative.     Eyes:  Negative for pain and redness.   Respiratory:  Negative for cough, chest tightness, shortness of breath and wheezing.    Cardiovascular:  Negative for chest pain and palpitations.   Gastrointestinal:  Negative for abdominal pain, diarrhea, nausea and vomiting.   Endocrine: Negative for cold intolerance, heat intolerance and polyuria.   Genitourinary:  Negative for difficulty urinating, dysuria, frequency and hematuria.   Musculoskeletal:  Negative for arthralgias, back pain, myalgias, neck pain and neck stiffness.   Skin:  Negative for pallor and rash.   Allergic/Immunologic: Negative.    Neurological:  Negative for dizziness, syncope, weakness, numbness and headaches.   Hematological:  Negative for adenopathy.   Psychiatric/Behavioral:  Negative for agitation, confusion and hallucinations. The patient is not nervous/anxious.      Objective:     Vital Signs (Most Recent):  Temp: 97 °F (36.1 °C) (12/10/23 0800)  Pulse: 74 (12/10/23 0800)  Resp: 16 (12/10/23 0800)  BP: (!) 110/43 (12/10/23 0800)  SpO2: 96 % (12/10/23 0800) Vital Signs (24h Range):  Temp:  [97 °F (36.1 °C)-98.9 °F (37.2 °C)] 97 °F (36.1 °C)  Pulse:  [74-79] 74  Resp:  [14-18] 16  SpO2:  [95 %-97 %] 96 %  BP: (102-115)/(41-60) 110/43     Weight: 78 kg (171 lb 15.3 oz)  Body mass index is 22.69 kg/m².    Intake/Output Summary (Last 24 hours) at 12/10/2023 1301  Last data filed at 12/10/2023 0830  Gross per 24 hour   Intake 600 ml   Output 300 ml   Net 300 ml           Physical Exam  Vitals reviewed.   Cardiovascular:      Rate and Rhythm: Normal rate and regular rhythm.      Heart sounds: Normal heart sounds.   Pulmonary:      Effort: Pulmonary effort is normal.      Breath sounds: Normal breath sounds.   Abdominal:      General: Abdomen is flat.  Bowel sounds are normal.      Palpations: Abdomen is soft.   Musculoskeletal:         General: Signs of injury present.      Comments: Wrapped left foot   Neurological:      Mental Status: He is alert.             Significant Labs: All pertinent labs within the past 24 hours have been reviewed.    Significant Imaging: I have reviewed all pertinent imaging results/findings within the past 24 hours.    Assessment/Plan:      * Abscess, toe, left  Pt seen in ER for surgical eval, gen surg recommend iv abx and OR  Pt to be admitted to [facility] post intervention  Cont iv abs  Wound care  Follow cx    11/29 - Now s/p amputation.  Continue abx.   11/30 - S/p L great toe amputation.  Continue abx for 1-2 weeks for soft tissue infection of the foot.   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  Stable    HTN (hypertension)  Cont home meds  Chronic controlled    PVD (peripheral vascular disease)  Cont DAPT  Statin  Monitor wound healing.       Cellulitis  Vanco, tentative end date 12/10        VTE Risk Mitigation (From admission, onward)      None            Discharge Planning   JORI:      Code Status: Full Code   Is the patient medically ready for discharge?:     Reason for patient still in hospital (select all that apply): Treatment  Discharge Plan A: Return to nursing home                  Chuy Washburn DO  Department of Hospital Medicine   [facility]

## 2023-12-10 NOTE — SUBJECTIVE & OBJECTIVE
Interval History: naeo    Review of Systems   Constitutional: Negative.  Negative for chills and fever.   HENT: Negative.     Eyes:  Negative for pain and redness.   Respiratory:  Negative for cough, chest tightness, shortness of breath and wheezing.    Cardiovascular:  Negative for chest pain and palpitations.   Gastrointestinal:  Negative for abdominal pain, diarrhea, nausea and vomiting.   Endocrine: Negative for cold intolerance, heat intolerance and polyuria.   Genitourinary:  Negative for difficulty urinating, dysuria, frequency and hematuria.   Musculoskeletal:  Negative for arthralgias, back pain, myalgias, neck pain and neck stiffness.   Skin:  Negative for pallor and rash.   Allergic/Immunologic: Negative.    Neurological:  Negative for dizziness, syncope, weakness, numbness and headaches.   Hematological:  Negative for adenopathy.   Psychiatric/Behavioral:  Negative for agitation, confusion and hallucinations. The patient is not nervous/anxious.      Objective:     Vital Signs (Most Recent):  Temp: 97 °F (36.1 °C) (12/10/23 0800)  Pulse: 74 (12/10/23 0800)  Resp: 16 (12/10/23 0800)  BP: (!) 110/43 (12/10/23 0800)  SpO2: 96 % (12/10/23 0800) Vital Signs (24h Range):  Temp:  [97 °F (36.1 °C)-98.9 °F (37.2 °C)] 97 °F (36.1 °C)  Pulse:  [74-79] 74  Resp:  [14-18] 16  SpO2:  [95 %-97 %] 96 %  BP: (102-115)/(41-60) 110/43     Weight: 78 kg (171 lb 15.3 oz)  Body mass index is 22.69 kg/m².    Intake/Output Summary (Last 24 hours) at 12/10/2023 1301  Last data filed at 12/10/2023 0830  Gross per 24 hour   Intake 600 ml   Output 300 ml   Net 300 ml           Physical Exam  Vitals reviewed.   Cardiovascular:      Rate and Rhythm: Normal rate and regular rhythm.      Heart sounds: Normal heart sounds.   Pulmonary:      Effort: Pulmonary effort is normal.      Breath sounds: Normal breath sounds.   Abdominal:      General: Abdomen is flat. Bowel sounds are normal.      Palpations: Abdomen is soft.   Musculoskeletal:          General: Signs of injury present.      Comments: Wrapped left foot   Neurological:      Mental Status: He is alert.             Significant Labs: All pertinent labs within the past 24 hours have been reviewed.    Significant Imaging: I have reviewed all pertinent imaging results/findings within the past 24 hours.

## 2023-12-11 VITALS
OXYGEN SATURATION: 97 % | RESPIRATION RATE: 16 BRPM | DIASTOLIC BLOOD PRESSURE: 79 MMHG | HEIGHT: 73 IN | SYSTOLIC BLOOD PRESSURE: 121 MMHG | WEIGHT: 171.94 LBS | BODY MASS INDEX: 22.79 KG/M2 | TEMPERATURE: 98 F | HEART RATE: 75 BPM

## 2023-12-11 LAB
ANION GAP SERPL CALCULATED.3IONS-SCNC: 11 MMOL/L (ref 7–16)
BASOPHILS # BLD AUTO: 0.05 K/UL (ref 0–0.2)
BASOPHILS NFR BLD AUTO: 0.9 % (ref 0–1)
BUN SERPL-MCNC: 30 MG/DL (ref 7–18)
BUN/CREAT SERPL: 21 (ref 6–20)
CALCIUM SERPL-MCNC: 8.6 MG/DL (ref 8.5–10.1)
CHLORIDE SERPL-SCNC: 109 MMOL/L (ref 98–107)
CO2 SERPL-SCNC: 22 MMOL/L (ref 21–32)
CREAT SERPL-MCNC: 1.41 MG/DL (ref 0.7–1.3)
DIFFERENTIAL METHOD BLD: ABNORMAL
EGFR (NO RACE VARIABLE) (RUSH/TITUS): 50 ML/MIN/1.73M2
EOSINOPHIL # BLD AUTO: 0.24 K/UL (ref 0–0.5)
EOSINOPHIL NFR BLD AUTO: 4.2 % (ref 1–4)
ERYTHROCYTE [DISTWIDTH] IN BLOOD BY AUTOMATED COUNT: 17 % (ref 11.5–14.5)
GLUCOSE SERPL-MCNC: 94 MG/DL (ref 74–106)
HCT VFR BLD AUTO: 32 % (ref 40–54)
HGB BLD-MCNC: 10.1 G/DL (ref 13.5–18)
IMM GRANULOCYTES # BLD AUTO: 0.03 K/UL (ref 0–0.04)
IMM GRANULOCYTES NFR BLD: 0.5 % (ref 0–0.4)
LYMPHOCYTES # BLD AUTO: 0.95 K/UL (ref 1–4.8)
LYMPHOCYTES NFR BLD AUTO: 16.6 % (ref 27–41)
MCH RBC QN AUTO: 28 PG (ref 27–31)
MCHC RBC AUTO-ENTMCNC: 31.6 G/DL (ref 32–36)
MCV RBC AUTO: 88.6 FL (ref 80–96)
MONOCYTES # BLD AUTO: 0.61 K/UL (ref 0–0.8)
MONOCYTES NFR BLD AUTO: 10.7 % (ref 2–6)
MPC BLD CALC-MCNC: 10.7 FL (ref 9.4–12.4)
NEUTROPHILS # BLD AUTO: 3.84 K/UL (ref 1.8–7.7)
NEUTROPHILS NFR BLD AUTO: 67.1 % (ref 53–65)
NRBC # BLD AUTO: 0 X10E3/UL
NRBC, AUTO (.00): 0 %
PLATELET # BLD AUTO: 178 K/UL (ref 150–400)
POTASSIUM SERPL-SCNC: 4.4 MMOL/L (ref 3.5–5.1)
RBC # BLD AUTO: 3.61 M/UL (ref 4.6–6.2)
SODIUM SERPL-SCNC: 138 MMOL/L (ref 136–145)
VANCOMYCIN TROUGH SERPL-MCNC: 15.4 ΜG/ML (ref 10–20)
WBC # BLD AUTO: 5.72 K/UL (ref 4.5–11)

## 2023-12-11 PROCEDURE — 25000003 PHARM REV CODE 250: Performed by: STUDENT IN AN ORGANIZED HEALTH CARE EDUCATION/TRAINING PROGRAM

## 2023-12-11 PROCEDURE — 99232 SBSQ HOSP IP/OBS MODERATE 35: CPT | Mod: ,,, | Performed by: NURSE PRACTITIONER

## 2023-12-11 PROCEDURE — 80048 BASIC METABOLIC PNL TOTAL CA: CPT | Performed by: STUDENT IN AN ORGANIZED HEALTH CARE EDUCATION/TRAINING PROGRAM

## 2023-12-11 PROCEDURE — 80202 ASSAY OF VANCOMYCIN: CPT | Performed by: STUDENT IN AN ORGANIZED HEALTH CARE EDUCATION/TRAINING PROGRAM

## 2023-12-11 PROCEDURE — 85025 COMPLETE CBC W/AUTO DIFF WBC: CPT | Performed by: STUDENT IN AN ORGANIZED HEALTH CARE EDUCATION/TRAINING PROGRAM

## 2023-12-11 PROCEDURE — 99239 HOSP IP/OBS DSCHRG MGMT >30: CPT | Mod: ,,, | Performed by: STUDENT IN AN ORGANIZED HEALTH CARE EDUCATION/TRAINING PROGRAM

## 2023-12-11 PROCEDURE — 25000003 PHARM REV CODE 250: Performed by: HOSPITALIST

## 2023-12-11 PROCEDURE — 99239 PR HOSPITAL DISCHARGE DAY,>30 MIN: ICD-10-PCS | Mod: ,,, | Performed by: STUDENT IN AN ORGANIZED HEALTH CARE EDUCATION/TRAINING PROGRAM

## 2023-12-11 PROCEDURE — 99232 PR SUBSEQUENT HOSPITAL CARE,LEVL II: ICD-10-PCS | Mod: ,,, | Performed by: NURSE PRACTITIONER

## 2023-12-11 PROCEDURE — 63600175 PHARM REV CODE 636 W HCPCS: Performed by: STUDENT IN AN ORGANIZED HEALTH CARE EDUCATION/TRAINING PROGRAM

## 2023-12-11 PROCEDURE — 25000003 PHARM REV CODE 250: Performed by: INTERNAL MEDICINE

## 2023-12-11 RX ORDER — POLYETHYLENE GLYCOL 3350 17 G/17G
17 POWDER, FOR SOLUTION ORAL DAILY
Refills: 0
Start: 2023-12-11

## 2023-12-11 RX ORDER — MICONAZOLE NITRATE 2 %
POWDER (GRAM) TOPICAL 2 TIMES DAILY
Refills: 0
Start: 2023-12-11 | End: 2023-12-21

## 2023-12-11 RX ORDER — SILVER SULFADIAZINE 10 G/1000G
CREAM TOPICAL DAILY PRN
Start: 2023-12-11

## 2023-12-11 RX ORDER — ATORVASTATIN CALCIUM 40 MG/1
40 TABLET, FILM COATED ORAL NIGHTLY
Qty: 90 TABLET | Refills: 3
Start: 2023-12-11 | End: 2024-12-10

## 2023-12-11 RX ADMIN — DOCUSATE SODIUM 200 MG: 100 CAPSULE, LIQUID FILLED ORAL at 09:12

## 2023-12-11 RX ADMIN — MICONAZOLE NITRATE 2 % TOPICAL POWDER: at 09:12

## 2023-12-11 RX ADMIN — LISINOPRIL 10 MG: 10 TABLET ORAL at 09:12

## 2023-12-11 RX ADMIN — FERROUS SULFATE TAB 325 MG (65 MG ELEMENTAL FE) 1 EACH: 325 (65 FE) TAB at 09:12

## 2023-12-11 RX ADMIN — FAMOTIDINE 20 MG: 20 TABLET, FILM COATED ORAL at 09:12

## 2023-12-11 RX ADMIN — VANCOMYCIN HYDROCHLORIDE 1500 MG: 1 INJECTION, POWDER, LYOPHILIZED, FOR SOLUTION INTRAVENOUS at 02:12

## 2023-12-11 RX ADMIN — TICAGRELOR 90 MG: 90 TABLET ORAL at 09:12

## 2023-12-11 RX ADMIN — PANTOPRAZOLE SODIUM 20 MG: 20 TABLET, DELAYED RELEASE ORAL at 09:12

## 2023-12-11 RX ADMIN — ACETAMINOPHEN 1000 MG: 500 TABLET, FILM COATED ORAL at 09:12

## 2023-12-11 RX ADMIN — CARVEDILOL 6.25 MG: 6.25 TABLET, FILM COATED ORAL at 09:12

## 2023-12-11 RX ADMIN — CYANOCOBALAMIN TAB 500 MCG 2000 MCG: 500 TAB at 09:12

## 2023-12-11 RX ADMIN — MEMANTINE 10 MG: 5 TABLET ORAL at 09:12

## 2023-12-11 NOTE — SUBJECTIVE & OBJECTIVE
Subjective:     HPI:  82 yo male with surgical wound to left great toe amputation site, right great toe and heel, and incontinence dermatitis to sacral/perineum. He is status post debridement of right great toe per Dr. Butcher 11/28. Retention sutures intact. Granulation tissue to wound bed. Right great toe with slough and eschar. Significant PMH includes  hypertension, PVD, CKD, and hypertension. Wound healing is complicated by multiple co-morbidities, poor vascular supply, edema, necrosis, tract(s), advanced age, fragile skin, no protective sensation, and infection.          Hospital Course:   11/29/2023 - Wound care consulted to evaluate and follow wounds . POC established today. Barriers to wound healing identified and preventive measures in place  12/6/2023 - Right great toe has improved. Continue with silvadene. Incision site is healing well. Continue optifoam ag. He has SDTI to left lateral foot, have been using foam borders with no improvement. D/c foam borders and pain with betadine and apply dry dressing.   12/11/2023 - Right toe has healed. Incision site continues to improve. Sutures removed today. Continue to protect with aquacel ag foam border           Scheduled Meds:   acetaminophen  1,000 mg Oral TID    aspirin  81 mg Oral QHS    atorvastatin  40 mg Oral QHS    carvediloL  6.25 mg Oral Daily    cyanocobalamin  2,000 mcg Oral Daily    docusate sodium  200 mg Oral BID    donepeziL  10 mg Oral QHS    famotidine  20 mg Oral Daily    ferrous sulfate  1 tablet Oral BID    lisinopriL  10 mg Oral Daily    memantine  10 mg Oral BID    miconazole NITRATE 2 %   Topical (Top) BID    mirtazapine  7.5 mg Oral Nightly    pantoprazole  20 mg Oral Daily    polyethylene glycol  17 g Oral Daily    ticagrelor  90 mg Oral BID     Continuous Infusions:  PRN Meds:acetaminophen, ondansetron, oxyCODONE, silver sulfADIAZINE 1%    Review of Systems   Unable to perform ROS: Dementia     Objective:     Vital Signs (Most  Recent):  Temp: 97.6 °F (36.4 °C) (12/11/23 0940)  Pulse: 75 (12/11/23 0800)  Resp: 16 (12/11/23 0800)  BP: 121/79 (12/11/23 0943)  SpO2: 97 % (12/11/23 0800) Vital Signs (24h Range):  Temp:  [97.6 °F (36.4 °C)-97.8 °F (36.6 °C)] 97.6 °F (36.4 °C)  Pulse:  [69-80] 75  Resp:  [14-16] 16  SpO2:  [96 %-97 %] 97 %  BP: (106-125)/(51-79) 121/79     Weight: 78 kg (171 lb 15.3 oz)  Body mass index is 22.69 kg/m².     Physical Exam  Vitals reviewed.   Constitutional:       Appearance: He is ill-appearing.   HENT:      Head: Normocephalic.   Cardiovascular:      Rate and Rhythm: Normal rate and regular rhythm.      Pulses: Normal pulses.      Heart sounds: Normal heart sounds.   Pulmonary:      Effort: Pulmonary effort is normal.      Breath sounds: Normal breath sounds.   Musculoskeletal:         General: Tenderness and deformity present.      Right lower leg: Edema present.      Left lower leg: Edema present.   Skin:     Capillary Refill: Capillary refill takes more than 3 seconds.      Findings: Erythema and rash present.      Comments: Wound, see LDA for photos/measurements   Neurological:      Mental Status: He is alert. Mental status is at baseline.      Motor: Weakness present.      Gait: Gait abnormal.

## 2023-12-11 NOTE — PLAN OF CARE
Problem: Adult Inpatient Plan of Care  Goal: Plan of Care Review  Outcome: Ongoing, Progressing  Goal: Patient-Specific Goal (Individualized)  Outcome: Ongoing, Progressing  Goal: Absence of Hospital-Acquired Illness or Injury  Outcome: Ongoing, Progressing  Goal: Optimal Comfort and Wellbeing  Outcome: Ongoing, Progressing     Problem: Skin Injury Risk Increased  Goal: Skin Health and Integrity  Outcome: Ongoing, Progressing     Problem: Infection  Goal: Absence of Infection Signs and Symptoms  Outcome: Ongoing, Progressing     Problem: Impaired Wound Healing  Goal: Optimal Wound Healing  Outcome: Ongoing, Progressing     Problem: Pain Acute  Goal: Acceptable Pain Control and Functional Ability  Outcome: Ongoing, Progressing     Problem: Fall Injury Risk  Goal: Absence of Fall and Fall-Related Injury  Outcome: Ongoing, Progressing

## 2023-12-11 NOTE — PLAN OF CARE
Problem: Adult Inpatient Plan of Care  Goal: Plan of Care Review  Outcome: Met     Problem: Infection  Goal: Absence of Infection Signs and Symptoms  Outcome: Met

## 2023-12-11 NOTE — NURSING
Pt being discharged to [facility]. Report called to Danica. Verbally voiced understanding.  1445 Pt left floor via his personal wheelchair with [facility] staff and his son. No acute distress noted. Respirations even and unlabored.

## 2023-12-11 NOTE — PROGRESS NOTES
[facility]  Wound Care and Hyperbarics REGINO  Progress Note    Patient Name: Riley Fox  MRN: [id number]  Admission Date: 11/28/2023  Hospital Length of Stay: 13 days  Attending Physician: Chuy Washburn DO  Primary Care Provider: Paulette, Primary Doctor         Subjective:     HPI:  82 yo male with surgical wound to left great toe amputation site, right great toe and heel, and incontinence dermatitis to sacral/perineum. He is status post debridement of right great toe per Dr. Butcher 11/28. Retention sutures intact. Granulation tissue to wound bed. Right great toe with slough and eschar. Significant PMH includes  hypertension, PVD, CKD, and hypertension. Wound healing is complicated by multiple co-morbidities, poor vascular supply, edema, necrosis, tract(s), advanced age, fragile skin, no protective sensation, and infection.          Hospital Course:   11/29/2023 - Wound care consulted to evaluate and follow wounds . POC established today. Barriers to wound healing identified and preventive measures in place  12/6/2023 - Right great toe has improved. Continue with silvadene. Incision site is healing well. Continue optifoam ag. He has SDTI to left lateral foot, have been using foam borders with no improvement. D/c foam borders and pain with betadine and apply dry dressing.   12/11/2023 - Right toe has healed. Incision site continues to improve. Sutures removed today. Continue to protect with aquacel ag foam border           Scheduled Meds:   acetaminophen  1,000 mg Oral TID    aspirin  81 mg Oral QHS    atorvastatin  40 mg Oral QHS    carvediloL  6.25 mg Oral Daily    cyanocobalamin  2,000 mcg Oral Daily    docusate sodium  200 mg Oral BID    donepeziL  10 mg Oral QHS    famotidine  20 mg Oral Daily    ferrous sulfate  1 tablet Oral BID    lisinopriL  10 mg Oral Daily    memantine  10 mg Oral BID    miconazole NITRATE 2 %   Topical (Top) BID    mirtazapine  7.5 mg Oral Nightly     pantoprazole  20 mg Oral Daily    polyethylene glycol  17 g Oral Daily    ticagrelor  90 mg Oral BID     Continuous Infusions:  PRN Meds:acetaminophen, ondansetron, oxyCODONE, silver sulfADIAZINE 1%    Review of Systems   Unable to perform ROS: Dementia     Objective:     Vital Signs (Most Recent):  Temp: 97.6 °F (36.4 °C) (12/11/23 0940)  Pulse: 75 (12/11/23 0800)  Resp: 16 (12/11/23 0800)  BP: 121/79 (12/11/23 0943)  SpO2: 97 % (12/11/23 0800) Vital Signs (24h Range):  Temp:  [97.6 °F (36.4 °C)-97.8 °F (36.6 °C)] 97.6 °F (36.4 °C)  Pulse:  [69-80] 75  Resp:  [14-16] 16  SpO2:  [96 %-97 %] 97 %  BP: (106-125)/(51-79) 121/79     Weight: 78 kg (171 lb 15.3 oz)  Body mass index is 22.69 kg/m².     Physical Exam  Vitals reviewed.   Constitutional:       Appearance: He is ill-appearing.   HENT:      Head: Normocephalic.   Cardiovascular:      Rate and Rhythm: Normal rate and regular rhythm.      Pulses: Normal pulses.      Heart sounds: Normal heart sounds.   Pulmonary:      Effort: Pulmonary effort is normal.      Breath sounds: Normal breath sounds.   Musculoskeletal:         General: Tenderness and deformity present.      Right lower leg: Edema present.      Left lower leg: Edema present.   Skin:     Capillary Refill: Capillary refill takes more than 3 seconds.      Findings: Erythema and rash present.      Comments: Wound, see LDA for photos/measurements   Neurological:      Mental Status: He is alert. Mental status is at baseline.      Motor: Weakness present.      Gait: Gait abnormal.            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

## 2023-12-11 NOTE — DISCHARGE SUMMARY
[facility] Medicine  Discharge Summary      Patient Name: Riley Fox  MRN: [id number]  [facility]: [id number]  Patient Class: IP- Inpatient  Admission Date: 11/28/2023  Hospital Length of Stay: 13 days  Discharge Date and Time:  12/11/2023 6:31 AM  Attending Physician: Chuy Washburn DO   Discharging Provider: hCuy Washburn DO  Primary Care Provider: Paulette, Primary Doctor    Primary Care Team: Networked reference to record PCT     HPI:   Patient is a 83-year-old male with past medical history of hypertension, spinal stenosis, peripheral arterial disease, questionable dementia, chronic kidney disease, who presents to the emergency room for evaluation of left great toe osteomyelitis along with cellulitis of left lower extremity.  Patient is a long-term resident at the nursing home secondary to his spinal stenosis and resulting immobility, it appears as though he has some contractures of his lower extremity, his son is at bedside, he tells me that the patient has had some memory issues lately, he also tells me that he has history of CVA in the past.  Patient endorses pain in bilateral lower extremities, mostly on the affected side that is the left great toe, although the right lower extremity also appears to have some wounds.  General surgery evaluated the patient in the ER and recommended the patient to take to the OR for debridement and possible amputation of the affected toe, followed by admission to [facility] for long-term wound care and IV antibiotics.  Patient otherwise is hemodynamically stable, denies any chest pain, denies any shortness of breath, denies any fevers chills nausea vomiting or diarrhea.    * No surgery found *      Hospital Course:   12/1- chart reviewed, continue vanco for 10 days  12/2-no complaints, doing well. Continue abx. Will return to [facility] at discharge  12/3-end date abx 12/11.  Daughter in room today,  updated. No questions.    12/5-doing well, no issues  12/6-no issues will complete abx in 5 days.   12/8-doing well, looks a little dehydrated. Will give small bolus  12/9-No complaints  12/10-home tomorrow    12/11-stable for discharge. Follow up with Dr. Butcher in 1 week. Follow up PCP. Continue wound care.      Goals of Care Treatment Preferences:  Code Status: Full Code      Consults:   Consults (From admission, onward)          Status Ordering Provider     Inpatient consult to General Surgery  Once        Provider:  Riana Butcher MD    Acknowledged DASHA, REHMAT U     Pharmacy to dose Vancomycin consult  Once        Provider:  (Not yet assigned)    Acknowledged DASHA REHMAT U            Neuro  Dementia  Cont medications being taken at NH.  Stable    Derm  Yeast dermatitis  Continue miconazole powder for 10 more days or until resolution    Cardiac/Vascular  PVD (peripheral vascular disease)  Cont DAPT  Statin  Monitor wound healing.       ID  * Abscess, toe, left  Completed abx  Continue wound care  Follow up wound care clinic, follow up Dr. 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    Specialties: General Surgery, Vascular Surgery  Contact information:  [address]  [facility]  [facility] [address]  [phone]               Kristen Damon MD. Schedule an appointment as soon as possible for a visit in 1 week(s).    Specialty: Family Medicine  Contact information:  [address]  [facility] [id number] [phone]               Sloane Mcgraw FNP. Schedule an appointment as soon as possible for a visit in 1 week(s).    Specialties: General Surgery, Wound Care  Contact information:  [address]  [facility] [address]  [phone]                       Contact information for after-discharge care       Destination       [facility] .    Service: Nursing Home  Contact information:  [address] [facility] [address]  [phone]                                 Patient Instructions:      Diet Dysphagia Mechanical Soft     Change dressing (specify)   Order Comments: Dressing change: daily.    Clean with vashe, normal saline, or soap and water. Apply betadine gauze. Cover with dry dressing and secure with paper tape.     Activity as tolerated       Significant Diagnostic Studies: Labs: All labs within the past 24 hours have been reviewed    Pending Diagnostic Studies:       Procedure Component Value Units Date/Time    EKG 12-lead [[id number]]     Order Status: Sent Lab Status: No result            Medications:  Reconciled Home Medications:      Medication List        START taking these medications      atorvastatin 40 MG tablet  Commonly known as: LIPITOR  Take 1 tablet (40 mg total) by mouth every evening.  Replaces: rosuvastatin 20 MG tablet     miconazole NITRATE 2 % 2 % top powder  Commonly known as: MICOTIN  Apply topically 2 (two) times daily. for 10 days     polyethylene glycol 17 gram Pwpk  Commonly known as:  GLYCOLAX  Take 17 g by mouth once daily.  Replaces: polyethylene glycol 17 gram/dose powder     silver sulfADIAZINE 1% 1 % cream  Commonly known as: SILVADENE  Apply topically daily as needed.            CONTINUE taking these medications      aspirin 81 MG EC tablet  Commonly known as: ECOTRIN  Take 81 mg by mouth every evening.     BRILINTA 90 mg tablet  Generic drug: ticagrelor  Take 90 mg by mouth 2 (two) times daily.     carvediloL 6.25 MG tablet  Commonly known as: COREG  Take 6.25 mg by mouth once daily.     docusate sodium 100 MG capsule  Commonly known as: COLACE  Take 200 mg by mouth 2 (two) times daily.     donepeziL 10 MG tablet  Commonly known as: ARICEPT  Take 10 mg by mouth every evening.     ferrous sulfate Tab tablet  Commonly known as: FEOSOL  Take 1 tablet by mouth 2 (two) times daily.     HYDROcodone-acetaminophen 5-325 mg per tablet  Commonly known as: NORCO  Take 1 tablet by mouth every evening.     lisinopriL 10 MG tablet  Take 10 mg by mouth once daily.     memantine 10 MG Tab  Commonly known as: NAMENDA  Take 10 mg by mouth 2 (two) times daily.     mirtazapine 7.5 MG Tab  Commonly known as: REMERON  Take 7.5 mg by mouth nightly.     multivitamin per tablet  Commonly known as: THERAGRAN  Take 1 tablet by mouth once daily.     oxybutynin 10 MG 24 hr tablet  Commonly known as: DITROPAN-XL  Take 10 mg by mouth every evening.     pantoprazole 20 MG tablet  Commonly known as: PROTONIX  Take 20 mg by mouth once daily.     polyvinyl alcohol (artificial tears) 1.4 % ophthalmic solution  Commonly known as: LIQUIFILM TEARS  Place 1 drop into both eyes 2 (two) times daily as needed.     PRESERVISION AREDS-2 ORAL  Take 1 capsule by mouth 2 (two) times a day.     promethazine 25 mg/mL injection  Commonly known as: PHENERGAN  Inject 12.5 mg into the muscle every 4 (four) hours as needed.     rOPINIRole 0.5 MG tablet  Commonly known as: REQUIP  Take 0.5 mg by mouth every evening.     VITAMIN B-12 500 MCG  tablet  Generic drug: cyanocobalamin  Take 4 tablets by mouth once daily.            STOP taking these medications      famotidine 20 MG tablet  Commonly known as: PEPCID     levoFLOXacin 500 MG tablet  Commonly known as: LEVAQUIN     melatonin 3 mg Cap     mupirocin 2 % ointment  Commonly known as: BACTROBAN     nystatin 100,000 unit/mL suspension  Commonly known as: MYCOSTATIN     polyethylene glycol 17 gram/dose powder  Commonly known as: GLYCOLAX  Replaced by: polyethylene glycol 17 gram Pwpk     rosuvastatin 20 MG tablet  Commonly known as: CRESTOR  Replaced by: atorvastatin 40 MG tablet              Indwelling Lines/Drains at time of discharge:   Lines/Drains/Airways       Drain  Duration             Male External Urinary Catheter 12/02/23 0459 Small 9 days                    Time spent on the discharge of patient: >30 minutes         Chuy Washburn DO  Department of Hospital Medicine  [facility]

## 2023-12-11 NOTE — PROGRESS NOTES
Discharge Note    Patient is 171 pounds with a BMI of 22.69. He is ordered a mechanical soft diet with pureed meats with Boost Plus BID, documented intake 50%. Recommend continue current diet as tolerated. Encourage good po intakes.

## 2023-12-21 ENCOUNTER — OFFICE VISIT (OUTPATIENT)
Dept: VASCULAR SURGERY | Facility: CLINIC | Age: 83
End: 2023-12-21
Payer: MEDICARE

## 2023-12-21 VITALS — WEIGHT: 171 LBS | BODY MASS INDEX: 22.66 KG/M2 | HEIGHT: 73 IN

## 2023-12-21 DIAGNOSIS — Z09 SURGERY FOLLOW-UP: Primary | ICD-10-CM

## 2023-12-21 PROCEDURE — 99024 PR POST-OP FOLLOW-UP VISIT: ICD-10-PCS | Mod: ,,, | Performed by: SURGERY

## 2023-12-21 PROCEDURE — 99214 OFFICE O/P EST MOD 30 MIN: CPT | Mod: PBBFAC | Performed by: SURGERY

## 2023-12-21 PROCEDURE — 99024 POSTOP FOLLOW-UP VISIT: CPT | Mod: ,,, | Performed by: SURGERY

## 2023-12-21 NOTE — PROGRESS NOTES
General surgery clinic    Follow-up great toe amputation     Wounds healing without signs or symptoms of infection     Follow up p.r.n.

## 2024-06-20 NOTE — ED PROVIDER NOTES
[facility] Date: 11[facility]27[facility]    SCRIBE #1 NOTE: I, Mabel Hernandez, am scribing for, and in the presence of,  Corrine Lima MD. I have scribed the entire note.       History     Chief Complaint   Patient presents with    Wound Check     This is an 82 y/o male,who presents to the ED for a wound check. His family states the pt has been known to drag his toes when in his wheelchair and now has wounds to the left great toe. He has spoken to Dr. Butcher and notes he was told to come to the ED so the pt can be admitted for amputation of the toe. There is no Hx of a fever.     The history is provided by the patient and a relative. No  was used.     Review of patient's allergies indicates:   Allergen Reactions    Ether      No past medical history on file.  Past Surgical History:   Procedure Laterality Date    TOE AMPUTATION Left 11/28/2023    Procedure: AMPUTATION, TOE;  Surgeon: Riana Butcher MD;  Location: [facility];  Service: General;  Laterality: Left;     No family history on file.  Social History     Tobacco Use    Smoking status: Former     Types: Cigarettes    Smokeless tobacco: Former     Review of Systems   Constitutional:  Negative for fever.   Skin:  Positive for wound (Wounds noted to the bilateral great toes.).   All other systems reviewed and are negative.      Physical Exam     Initial Vitals   BP Pulse Resp Temp SpO2   11/27/23 1638 11/27/23 1642 11/27/23 1638 11/27/23 1638 11/27/23 1642   (!) 103/58 74 18 97.5 °F (36.4 °C) 100 %      MAP       --                Physical Exam    Nursing note and vitals reviewed.  Constitutional: He appears well-developed and well-nourished.   HENT:   Head: Normocephalic and atraumatic.   Eyes: Conjunctivae and EOM are normal. Pupils are equal, round, and reactive to light.   Neck: Neck supple.   Normal range of motion.  Musculoskeletal:         General: Normal range of motion.      Cervical back: Normal range of motion and neck supple.       Comments: The left great toe is red and swollen.   The right great toe has small ulcers noted to it.   BLE weakness.        Neurological: He is alert and oriented to person, place, and time.   Skin: Skin is warm and dry.   Psychiatric: He has a normal mood and affect.         ED Course   Procedures  Labs Reviewed   BASIC METABOLIC PANEL - Abnormal; Notable for the following components:       Result Value    Chloride 112 (*)     BUN 40 (*)     Creatinine 1.92 (*)     BUN/Creatinine Ratio 21 (*)     eGFR 35 (*)     All other components within normal limits   CBC WITH DIFFERENTIAL - Abnormal; Notable for the following components:    RBC 3.46 (*)     Hemoglobin 9.7 (*)     Hematocrit 32.0 (*)     MCHC 30.3 (*)     RDW 15.9 (*)     Neutrophils % 73.6 (*)     Lymphocytes % 11.0 (*)     Monocytes % 11.6 (*)     Immature Granulocytes % 1.0 (*)     Lymphocytes, Absolute 0.87 (*)     Monocytes, Absolute 0.92 (*)     Immature Granulocytes, Absolute 0.08 (*)     All other components within normal limits   PROTIME-INR - Abnormal; Notable for the following components:    PT 16.1 (*)     All other components within normal limits   SARS-COV-2 RNA AMPLIFICATION, QUAL - Normal    Narrative:     Negative SARS-CoV results should not be used as the sole basis for treatment or patient management decisions; negative results should be considered in the context of a patient's recent exposures, history and the presene of clinical signs and symptoms consistent with COVID-19.  Negative results should be treated as presumptive and confirmed by molecular assay, if necessary for patient management.   APTT - Normal   CBC W/ AUTO DIFFERENTIAL    Narrative:     The following orders were created for panel order CBC Auto Differential.  Procedure                               Abnormality         Status                     ---------                               -----------         ------                     CBC with Differential[[id number]]        Abnormal            Final result                 Please view results for these tests on the individual orders.          Imaging Results              X-Ray Foot Complete Left (Final result)  Result time 11/28/23 08:27:12   Procedure changed from X-Ray Foot 2 View Left     Final result by Reji Gillespie MD (11/28/23 08:27:12)                   Impression:      Soft tissue swelling of the great toe distally with erosion distal phalanx concerning for osteomyelitis.      Electronically signed by: Reji Gillespie  Date:    11/28/2023  Time:    08:27               Narrative:    EXAMINATION:  XR FOOT COMPLETE 3 VIEW LEFT    CLINICAL HISTORY:  wound;great toe abscess;.    TECHNIQUE:  AP, lateral and oblique views of the left foot were performed.    COMPARISON:  None    FINDINGS:  Diffuse degenerative changes are seen.  There is soft tissue swelling at the great toe distally and lucency of the distal phalanx of the great toe.  On the lateral view there appears to be some erosion of the distal portions of the distal phalanx great toe as well as in the plantar surface.  No radiopaque foreign bodies.                                       Medications   piperacillin-tazobactam (ZOSYN) 4.5 g in dextrose 5 % in water (D5W) 100 mL IVPB (MB+) (0 g Intravenous Stopped 11/28/23 0840)     Medical Decision Making  Pvd with toe injury / infection    DDX:  CELLULITIS VS GANGRENE VS OTHER +/- PVD VS OTHER    ADMIT    Amount and/or Complexity of Data Reviewed  Labs: ordered. Decision-making details documented in ED Course.  Radiology: ordered. Decision-making details documented in ED Course.  Discussion of management or test interpretation with external provider(s): Dr. Lima contacts Dr. Butcher to discuss the pt's case and possible admissions. Dr. Butcher would like the pt to be NPO after midnight.     Risk  Decision regarding hospitalization.              Attending Attestation:           Physician Attestation for Scribe:  Physician Attestation  Detail Level: Detailed Quality 226: Preventive Care And Screening: Tobacco Use: Screening And Cessation Intervention: Patient screened for tobacco use and is an ex/non-smoker Statement for Scribe #1: I, Ashkan Lima MD, reviewed documentation, as scribed by Mabel Hernandez in my presence, and it is both accurate and complete.                                    Clinical Impression:  Final diagnoses:  [I73.9] PVD (peripheral vascular disease)  [L02.612] Abscess, toe, left (Primary)          ED Disposition Condition    Admit Stable                Ashkan Lima MD  01/08/24 9346     Quality 47: Advance Care Plan: Advance Care Planning discussed and documented; advance care plan or surrogate decision maker documented in the medical record.

## 2024-07-05 NOTE — PLAN OF CARE
[facility]  Discharge Final Note    Primary Care Provider: Paulette, Primary Doctor    Expected Discharge Date: 12/11/2023    Final Discharge Note (most recent)       Final Note - 12/11/23 1005          Final Note    Assessment Type Final Discharge Note     Anticipated Discharge Disposition Skilled Nursing Facility        Post-Acute Status    Post-Acute Authorization Placement     Post-Acute Placement Status Set-up Complete/Auth obtained     Patient choice form signed by patient/caregiver List with quality metrics by geographic area provided;List from CMS Compare;List from System Post-Acute Care     Discharge Delays None known at this time                     Important Message from Medicare  Important Message from Medicare regarding Discharge Appeal Rights: Given to patient/caregiver, Explained to patient/caregiver, Signed/date by patient/caregiver     Date IMM was signed: 12/11/23  Time IMM was signed: 1000     Follow-up providers       Riana Butcher MD   Specialty: General Surgery, Vascular Surgery    [address]  [facility] [address]   Phone: [phone]       Next Steps: Follow up in 1 week(s)    Kristen Damon MD   Specialty: Family Medicine    [address] [facility] [address]   Phone: [phone]       Next Steps: Schedule an appointment as soon as possible for a visit in 1 week(s)    Instructions: Appt: 12/20/2023 @ 14:00    Sloane Mcgraw FNP   Specialty: General Surgery, Wound Care    [address]  [facility] [address]   Phone: [phone]       Next Steps: Schedule an appointment as soon as possible for a visit in 1 week(s)    Instructions: Appt: 12/27/2023 @ 0900              After-discharge care                Destination       [facility]   Service: Nursing Home    [address]  [facility] [address]   Phone: [phone]                             Pt dc to NH this day. [facility] will pick pt up  US order changed to stat please let patient know  Cindy Robert DO     @1215, as he has his electric wc. Nurse is aware now. Packet taken to floor. 0 further dc needs.

## (undated) DEVICE — Device

## (undated) DEVICE — COLLECTOR SPECIMEN ANAEROBIC

## (undated) DEVICE — TRAY SKIN SCRUB WET PREMIUM

## (undated) DEVICE — SPONGE COTTON TRAY 4X4IN

## (undated) DEVICE — BANDAGE GAUZE COT STRL 4.5X4.1

## (undated) DEVICE — SYR 10CC LUER LOCK

## (undated) DEVICE — GLOVE 6.0 PROTEXIS PI BLUE

## (undated) DEVICE — ELECTRODE BLADE INSULATED 1 IN

## (undated) DEVICE — GLOVE PROTEXIS PI SYN SURG 7.5

## (undated) DEVICE — SUT ETHILON 2-0 FS 18IN BLK

## (undated) DEVICE — GLOVE PROTEXIS PI SYN SURG 6.0

## (undated) DEVICE — SOL NACL IRR 1000ML BTL